# Patient Record
Sex: MALE | Race: WHITE | Employment: FULL TIME | URBAN - METROPOLITAN AREA
[De-identification: names, ages, dates, MRNs, and addresses within clinical notes are randomized per-mention and may not be internally consistent; named-entity substitution may affect disease eponyms.]

---

## 2023-10-30 ENCOUNTER — HOSPITAL ENCOUNTER (OUTPATIENT)
Dept: PREADMISSION TESTING | Age: 63
Discharge: HOME | End: 2023-11-03
Payer: COMMERCIAL

## 2023-10-30 VITALS
RESPIRATION RATE: 20 BRPM | WEIGHT: 218.4 LBS | BODY MASS INDEX: 35.1 KG/M2 | TEMPERATURE: 97.8 F | DIASTOLIC BLOOD PRESSURE: 67 MMHG | SYSTOLIC BLOOD PRESSURE: 123 MMHG | HEIGHT: 66 IN | HEART RATE: 65 BPM | OXYGEN SATURATION: 98 %

## 2023-10-30 LAB
ABO + RH BLD: NORMAL
ALBUMIN SERPL-MCNC: 4.6 G/DL (ref 3.5–4.6)
ALP SERPL-CCNC: 82 U/L (ref 35–104)
ALT SERPL-CCNC: 24 U/L (ref 0–41)
ANION GAP SERPL CALCULATED.3IONS-SCNC: 16 MEQ/L (ref 9–15)
APTT PPP: 33.1 SEC (ref 24.4–36.8)
AST SERPL-CCNC: 19 U/L (ref 0–40)
BASOPHILS # BLD: 0 K/UL (ref 0–0.2)
BASOPHILS NFR BLD: 0.3 %
BILIRUB SERPL-MCNC: 1 MG/DL (ref 0.2–0.7)
BILIRUB UR QL STRIP: NEGATIVE
BLD GP AB SCN SERPL QL: NORMAL
BUN SERPL-MCNC: 21 MG/DL (ref 8–23)
CALCIUM SERPL-MCNC: 10.1 MG/DL (ref 8.5–9.9)
CHLORIDE SERPL-SCNC: 102 MEQ/L (ref 95–107)
CLARITY UR: CLEAR
CO2 SERPL-SCNC: 22 MEQ/L (ref 20–31)
COLOR UR: YELLOW
CREAT SERPL-MCNC: 1.13 MG/DL (ref 0.7–1.2)
EOSINOPHIL # BLD: 0.1 K/UL (ref 0–0.7)
EOSINOPHIL NFR BLD: 1.5 %
ERYTHROCYTE [DISTWIDTH] IN BLOOD BY AUTOMATED COUNT: 12.4 % (ref 11.5–14.5)
GLOBULIN SER CALC-MCNC: 2.8 G/DL (ref 2.3–3.5)
GLUCOSE SERPL-MCNC: 102 MG/DL (ref 70–99)
GLUCOSE UR STRIP-MCNC: NEGATIVE MG/DL
HCT VFR BLD AUTO: 40.9 % (ref 42–52)
HGB BLD-MCNC: 13 G/DL (ref 14–18)
HGB UR QL STRIP: NEGATIVE
INR PPP: 1
KETONES UR STRIP-MCNC: NEGATIVE MG/DL
LEUKOCYTE ESTERASE UR QL STRIP: NEGATIVE
LYMPHOCYTES # BLD: 1.4 K/UL (ref 1–4.8)
LYMPHOCYTES NFR BLD: 23.3 %
MCH RBC QN AUTO: 26.9 PG (ref 27–31.3)
MCHC RBC AUTO-ENTMCNC: 31.8 % (ref 33–37)
MCV RBC AUTO: 84.7 FL (ref 79–92.2)
MONOCYTES # BLD: 0.6 K/UL (ref 0.2–0.8)
MONOCYTES NFR BLD: 10.8 %
NEUTROPHILS # BLD: 3.7 K/UL (ref 1.4–6.5)
NEUTS SEG NFR BLD: 63.1 %
NITRITE UR QL STRIP: NEGATIVE
PH UR STRIP: 6.5 [PH] (ref 5–9)
PLATELET # BLD AUTO: 242 K/UL (ref 130–400)
POTASSIUM SERPL-SCNC: 4.2 MEQ/L (ref 3.4–4.9)
PROT SERPL-MCNC: 7.4 G/DL (ref 6.3–8)
PROT UR STRIP-MCNC: NEGATIVE MG/DL
PROTHROMBIN TIME: 13.1 SEC (ref 12.3–14.9)
RBC # BLD AUTO: 4.83 M/UL (ref 4.7–6.1)
SODIUM SERPL-SCNC: 140 MEQ/L (ref 135–144)
SP GR UR STRIP: 1.01 (ref 1–1.03)
URINE REFLEX TO CULTURE: NORMAL
UROBILINOGEN UR STRIP-ACNC: 0.2 E.U./DL
WBC # BLD AUTO: 5.9 K/UL (ref 4.8–10.8)

## 2023-10-30 PROCEDURE — 87641 MR-STAPH DNA AMP PROBE: CPT

## 2023-10-30 PROCEDURE — 80053 COMPREHEN METABOLIC PANEL: CPT

## 2023-10-30 PROCEDURE — 85610 PROTHROMBIN TIME: CPT

## 2023-10-30 PROCEDURE — 85730 THROMBOPLASTIN TIME PARTIAL: CPT

## 2023-10-30 PROCEDURE — 86900 BLOOD TYPING SEROLOGIC ABO: CPT

## 2023-10-30 PROCEDURE — 86901 BLOOD TYPING SEROLOGIC RH(D): CPT

## 2023-10-30 PROCEDURE — 86850 RBC ANTIBODY SCREEN: CPT

## 2023-10-30 PROCEDURE — 81003 URINALYSIS AUTO W/O SCOPE: CPT

## 2023-10-30 PROCEDURE — 85025 COMPLETE CBC W/AUTO DIFF WBC: CPT

## 2023-10-30 PROCEDURE — 93005 ELECTROCARDIOGRAM TRACING: CPT | Performed by: ORTHOPAEDIC SURGERY

## 2023-10-30 RX ORDER — MELOXICAM 15 MG/1
15 TABLET ORAL DAILY
Qty: 30 TABLET | Refills: 11 | COMMUNITY
Start: 2023-07-18 | End: 2024-07-12

## 2023-10-30 RX ORDER — VALACYCLOVIR HYDROCHLORIDE 1 G/1
2000 TABLET, FILM COATED ORAL 2 TIMES DAILY
COMMUNITY
Start: 2023-07-18

## 2023-10-30 RX ORDER — ATORVASTATIN CALCIUM 20 MG/1
20 TABLET, FILM COATED ORAL DAILY
COMMUNITY
Start: 2023-07-18

## 2023-10-30 RX ORDER — TAMSULOSIN HYDROCHLORIDE 0.4 MG/1
0.4 CAPSULE ORAL NIGHTLY
COMMUNITY
Start: 2023-07-18

## 2023-10-30 NOTE — PROGRESS NOTES
PAT appt completed, pt given pre-op instructions (orange sheet) and Mary Anne-hex with instruction sheet. CBCWD, CMP, PT/INR, aPTT, T/S, UAR, MRSA swab & EKG done.  Electronically signed by Celestino You RN on 10/30/2023 at 11:39 AM

## 2023-10-31 LAB
EKG ATRIAL RATE: 64 BPM
EKG P AXIS: 36 DEGREES
EKG P-R INTERVAL: 172 MS
EKG Q-T INTERVAL: 396 MS
EKG QRS DURATION: 94 MS
EKG QTC CALCULATION (BAZETT): 408 MS
EKG R AXIS: -7 DEGREES
EKG T AXIS: 21 DEGREES
EKG VENTRICULAR RATE: 64 BPM
MRSA, DNA, NASAL: NEGATIVE
SPECIMEN DESCRIPTION: NORMAL

## 2023-11-05 ENCOUNTER — ANESTHESIA EVENT (OUTPATIENT)
Dept: OPERATING ROOM | Age: 63
End: 2023-11-05
Payer: COMMERCIAL

## 2023-11-06 ENCOUNTER — APPOINTMENT (OUTPATIENT)
Dept: GENERAL RADIOLOGY | Age: 63
End: 2023-11-06
Attending: ORTHOPAEDIC SURGERY
Payer: COMMERCIAL

## 2023-11-06 ENCOUNTER — HOSPITAL ENCOUNTER (OUTPATIENT)
Age: 63
Setting detail: OBSERVATION
Discharge: HOME HEALTH | End: 2023-11-07
Attending: ORTHOPAEDIC SURGERY | Admitting: ORTHOPAEDIC SURGERY
Payer: COMMERCIAL

## 2023-11-06 ENCOUNTER — ANESTHESIA (OUTPATIENT)
Dept: OPERATING ROOM | Age: 63
End: 2023-11-06
Payer: COMMERCIAL

## 2023-11-06 DIAGNOSIS — Z96.651 STATUS POST TOTAL KNEE REPLACEMENT, RIGHT: Primary | ICD-10-CM

## 2023-11-06 PROCEDURE — 73560 X-RAY EXAM OF KNEE 1 OR 2: CPT

## 2023-11-06 PROCEDURE — 2500000003 HC RX 250 WO HCPCS: Performed by: ANESTHESIOLOGY

## 2023-11-06 PROCEDURE — 3600000004 HC SURGERY LEVEL 4 BASE: Performed by: ORTHOPAEDIC SURGERY

## 2023-11-06 PROCEDURE — 2580000003 HC RX 258: Performed by: NURSE PRACTITIONER

## 2023-11-06 PROCEDURE — 6360000002 HC RX W HCPCS: Performed by: ORTHOPAEDIC SURGERY

## 2023-11-06 PROCEDURE — 2580000003 HC RX 258: Performed by: ANESTHESIOLOGY

## 2023-11-06 PROCEDURE — 6370000000 HC RX 637 (ALT 250 FOR IP): Performed by: NURSE PRACTITIONER

## 2023-11-06 PROCEDURE — 6360000002 HC RX W HCPCS: Performed by: ANESTHESIOLOGY

## 2023-11-06 PROCEDURE — 64447 NJX AA&/STRD FEMORAL NRV IMG: CPT | Performed by: ANESTHESIOLOGY

## 2023-11-06 PROCEDURE — G0378 HOSPITAL OBSERVATION PER HR: HCPCS

## 2023-11-06 PROCEDURE — 3600000014 HC SURGERY LEVEL 4 ADDTL 15MIN: Performed by: ORTHOPAEDIC SURGERY

## 2023-11-06 PROCEDURE — 2720000010 HC SURG SUPPLY STERILE: Performed by: ORTHOPAEDIC SURGERY

## 2023-11-06 PROCEDURE — A4216 STERILE WATER/SALINE, 10 ML: HCPCS | Performed by: ORTHOPAEDIC SURGERY

## 2023-11-06 PROCEDURE — 7100000000 HC PACU RECOVERY - FIRST 15 MIN: Performed by: ORTHOPAEDIC SURGERY

## 2023-11-06 PROCEDURE — 97166 OT EVAL MOD COMPLEX 45 MIN: CPT

## 2023-11-06 PROCEDURE — C1776 JOINT DEVICE (IMPLANTABLE): HCPCS | Performed by: ORTHOPAEDIC SURGERY

## 2023-11-06 PROCEDURE — 2580000003 HC RX 258: Performed by: ORTHOPAEDIC SURGERY

## 2023-11-06 PROCEDURE — 27447 TOTAL KNEE ARTHROPLASTY: CPT | Performed by: PHYSICIAN ASSISTANT

## 2023-11-06 PROCEDURE — 3700000000 HC ANESTHESIA ATTENDED CARE: Performed by: ORTHOPAEDIC SURGERY

## 2023-11-06 PROCEDURE — 2709999900 HC NON-CHARGEABLE SUPPLY: Performed by: ORTHOPAEDIC SURGERY

## 2023-11-06 PROCEDURE — 94150 VITAL CAPACITY TEST: CPT

## 2023-11-06 PROCEDURE — 6360000002 HC RX W HCPCS: Performed by: NURSE PRACTITIONER

## 2023-11-06 PROCEDURE — 7100000001 HC PACU RECOVERY - ADDTL 15 MIN: Performed by: ORTHOPAEDIC SURGERY

## 2023-11-06 PROCEDURE — 6370000000 HC RX 637 (ALT 250 FOR IP): Performed by: REGISTERED NURSE

## 2023-11-06 PROCEDURE — 27447 TOTAL KNEE ARTHROPLASTY: CPT | Performed by: ORTHOPAEDIC SURGERY

## 2023-11-06 PROCEDURE — 97162 PT EVAL MOD COMPLEX 30 MIN: CPT

## 2023-11-06 PROCEDURE — 3700000001 HC ADD 15 MINUTES (ANESTHESIA): Performed by: ORTHOPAEDIC SURGERY

## 2023-11-06 DEVICE — IMPLANTABLE DEVICE: Type: IMPLANTABLE DEVICE | Site: KNEE | Status: FUNCTIONAL

## 2023-11-06 DEVICE — KIT TKR TRABECULAR MTL FEM TIB PAT AND VIT E SURF PERSONA: Type: IMPLANTABLE DEVICE | Site: KNEE | Status: FUNCTIONAL

## 2023-11-06 DEVICE — PSN MC VE ASF R 14MM 6-7/EF: Type: IMPLANTABLE DEVICE | Site: KNEE | Status: FUNCTIONAL

## 2023-11-06 DEVICE — IMPLANTABLE DEVICE
Type: IMPLANTABLE DEVICE | Site: KNEE | Status: FUNCTIONAL
Brand: NEXGEN® TRABECULAR METAL™

## 2023-11-06 DEVICE — BASEPLATE TIB KEELED 0 DEG F RT KNEE SPIKE OSSEOTI PERSONA: Type: IMPLANTABLE DEVICE | Site: KNEE | Status: FUNCTIONAL

## 2023-11-06 RX ORDER — ACETAMINOPHEN 325 MG/1
650 TABLET ORAL EVERY 6 HOURS
Status: DISCONTINUED | OUTPATIENT
Start: 2023-11-06 | End: 2023-11-07 | Stop reason: HOSPADM

## 2023-11-06 RX ORDER — TAMSULOSIN HYDROCHLORIDE 0.4 MG/1
0.4 CAPSULE ORAL NIGHTLY
Status: DISCONTINUED | OUTPATIENT
Start: 2023-11-07 | End: 2023-11-07 | Stop reason: HOSPADM

## 2023-11-06 RX ORDER — SENNA AND DOCUSATE SODIUM 50; 8.6 MG/1; MG/1
1 TABLET, FILM COATED ORAL 2 TIMES DAILY
Status: DISCONTINUED | OUTPATIENT
Start: 2023-11-06 | End: 2023-11-07 | Stop reason: HOSPADM

## 2023-11-06 RX ORDER — ATORVASTATIN CALCIUM 20 MG/1
20 TABLET, FILM COATED ORAL DAILY
Status: DISCONTINUED | OUTPATIENT
Start: 2023-11-06 | End: 2023-11-07 | Stop reason: HOSPADM

## 2023-11-06 RX ORDER — HYDROXYZINE HYDROCHLORIDE 10 MG/1
10 TABLET, FILM COATED ORAL EVERY 8 HOURS PRN
Status: DISCONTINUED | OUTPATIENT
Start: 2023-11-06 | End: 2023-11-07 | Stop reason: HOSPADM

## 2023-11-06 RX ORDER — TRANEXAMIC ACID 650 MG/1
1950 TABLET ORAL
Status: DISCONTINUED | OUTPATIENT
Start: 2023-11-06 | End: 2023-11-06 | Stop reason: HOSPADM

## 2023-11-06 RX ORDER — ROPIVACAINE HYDROCHLORIDE 5 MG/ML
INJECTION, SOLUTION EPIDURAL; INFILTRATION; PERINEURAL
Status: COMPLETED | OUTPATIENT
Start: 2023-11-06 | End: 2023-11-06

## 2023-11-06 RX ORDER — SODIUM CHLORIDE 0.9 % (FLUSH) 0.9 %
5-40 SYRINGE (ML) INJECTION EVERY 12 HOURS SCHEDULED
Status: DISCONTINUED | OUTPATIENT
Start: 2023-11-06 | End: 2023-11-07 | Stop reason: HOSPADM

## 2023-11-06 RX ORDER — SODIUM CHLORIDE, SODIUM LACTATE, POTASSIUM CHLORIDE, CALCIUM CHLORIDE 600; 310; 30; 20 MG/100ML; MG/100ML; MG/100ML; MG/100ML
INJECTION, SOLUTION INTRAVENOUS CONTINUOUS
Status: DISCONTINUED | OUTPATIENT
Start: 2023-11-06 | End: 2023-11-06 | Stop reason: HOSPADM

## 2023-11-06 RX ORDER — OXYCODONE HCL 10 MG/1
10 TABLET, FILM COATED, EXTENDED RELEASE ORAL ONCE
Status: COMPLETED | OUTPATIENT
Start: 2023-11-06 | End: 2023-11-06

## 2023-11-06 RX ORDER — MAGNESIUM HYDROXIDE 1200 MG/15ML
LIQUID ORAL CONTINUOUS PRN
Status: DISCONTINUED | OUTPATIENT
Start: 2023-11-06 | End: 2023-11-06 | Stop reason: HOSPADM

## 2023-11-06 RX ORDER — MORPHINE SULFATE 2 MG/ML
2 INJECTION, SOLUTION INTRAMUSCULAR; INTRAVENOUS
Status: DISCONTINUED | OUTPATIENT
Start: 2023-11-06 | End: 2023-11-07 | Stop reason: HOSPADM

## 2023-11-06 RX ORDER — CELECOXIB 200 MG/1
200 CAPSULE ORAL ONCE
Status: COMPLETED | OUTPATIENT
Start: 2023-11-06 | End: 2023-11-06

## 2023-11-06 RX ORDER — SODIUM CHLORIDE 0.9 % (FLUSH) 0.9 %
5-40 SYRINGE (ML) INJECTION PRN
Status: DISCONTINUED | OUTPATIENT
Start: 2023-11-06 | End: 2023-11-07 | Stop reason: HOSPADM

## 2023-11-06 RX ORDER — TRANEXAMIC ACID 650 MG/1
1950 TABLET ORAL EVERY 8 HOURS
Status: COMPLETED | OUTPATIENT
Start: 2023-11-06 | End: 2023-11-06

## 2023-11-06 RX ORDER — ONDANSETRON 2 MG/ML
4 INJECTION INTRAMUSCULAR; INTRAVENOUS EVERY 6 HOURS PRN
Status: DISCONTINUED | OUTPATIENT
Start: 2023-11-06 | End: 2023-11-07 | Stop reason: HOSPADM

## 2023-11-06 RX ORDER — TAMSULOSIN HYDROCHLORIDE 0.4 MG/1
0.4 CAPSULE ORAL NIGHTLY
Status: DISCONTINUED | OUTPATIENT
Start: 2023-11-07 | End: 2023-11-06

## 2023-11-06 RX ORDER — KETOROLAC TROMETHAMINE 15 MG/ML
7.5 INJECTION, SOLUTION INTRAMUSCULAR; INTRAVENOUS EVERY 6 HOURS
Status: COMPLETED | OUTPATIENT
Start: 2023-11-06 | End: 2023-11-07

## 2023-11-06 RX ORDER — SODIUM CHLORIDE 9 MG/ML
INJECTION, SOLUTION INTRAVENOUS PRN
Status: DISCONTINUED | OUTPATIENT
Start: 2023-11-06 | End: 2023-11-07 | Stop reason: HOSPADM

## 2023-11-06 RX ORDER — TRANEXAMIC ACID 650 MG/1
1950 TABLET ORAL ONCE
Status: COMPLETED | OUTPATIENT
Start: 2023-11-07 | End: 2023-11-07

## 2023-11-06 RX ORDER — PROPOFOL 10 MG/ML
INJECTION, EMULSION INTRAVENOUS CONTINUOUS PRN
Status: DISCONTINUED | OUTPATIENT
Start: 2023-11-06 | End: 2023-11-06 | Stop reason: SDUPTHER

## 2023-11-06 RX ORDER — OXYCODONE HYDROCHLORIDE 5 MG/1
2.5 TABLET ORAL EVERY 4 HOURS PRN
Status: DISCONTINUED | OUTPATIENT
Start: 2023-11-06 | End: 2023-11-07 | Stop reason: HOSPADM

## 2023-11-06 RX ORDER — MIDAZOLAM HYDROCHLORIDE 1 MG/ML
INJECTION INTRAMUSCULAR; INTRAVENOUS PRN
Status: DISCONTINUED | OUTPATIENT
Start: 2023-11-06 | End: 2023-11-06 | Stop reason: SDUPTHER

## 2023-11-06 RX ORDER — OXYCODONE HYDROCHLORIDE 5 MG/1
5 TABLET ORAL EVERY 4 HOURS PRN
Status: DISCONTINUED | OUTPATIENT
Start: 2023-11-06 | End: 2023-11-07 | Stop reason: HOSPADM

## 2023-11-06 RX ORDER — CYCLOBENZAPRINE HCL 10 MG
10 TABLET ORAL EVERY 12 HOURS PRN
Status: DISCONTINUED | OUTPATIENT
Start: 2023-11-06 | End: 2023-11-07 | Stop reason: HOSPADM

## 2023-11-06 RX ORDER — LISINOPRIL AND HYDROCHLOROTHIAZIDE 20; 12.5 MG/1; MG/1
1 TABLET ORAL DAILY
Status: DISCONTINUED | OUTPATIENT
Start: 2023-11-07 | End: 2023-11-07 | Stop reason: HOSPADM

## 2023-11-06 RX ORDER — ACETAMINOPHEN 500 MG
1000 TABLET ORAL ONCE
Status: COMPLETED | OUTPATIENT
Start: 2023-11-06 | End: 2023-11-06

## 2023-11-06 RX ORDER — EPHEDRINE SULFATE 50 MG/ML
INJECTION, SOLUTION INTRAVENOUS PRN
Status: DISCONTINUED | OUTPATIENT
Start: 2023-11-06 | End: 2023-11-06 | Stop reason: SDUPTHER

## 2023-11-06 RX ORDER — BUPIVACAINE HYDROCHLORIDE 5 MG/ML
INJECTION, SOLUTION EPIDURAL; INTRACAUDAL
Status: COMPLETED | OUTPATIENT
Start: 2023-11-06 | End: 2023-11-06

## 2023-11-06 RX ORDER — ASPIRIN 81 MG/1
81 TABLET ORAL 2 TIMES DAILY
Status: DISCONTINUED | OUTPATIENT
Start: 2023-11-06 | End: 2023-11-07 | Stop reason: HOSPADM

## 2023-11-06 RX ORDER — ONDANSETRON 4 MG/1
4 TABLET, ORALLY DISINTEGRATING ORAL EVERY 8 HOURS PRN
Status: DISCONTINUED | OUTPATIENT
Start: 2023-11-06 | End: 2023-11-07 | Stop reason: HOSPADM

## 2023-11-06 RX ORDER — MAGNESIUM HYDROXIDE/ALUMINUM HYDROXICE/SIMETHICONE 120; 1200; 1200 MG/30ML; MG/30ML; MG/30ML
15 SUSPENSION ORAL EVERY 6 HOURS PRN
Status: DISCONTINUED | OUTPATIENT
Start: 2023-11-06 | End: 2023-11-07 | Stop reason: HOSPADM

## 2023-11-06 RX ORDER — SODIUM CHLORIDE 9 MG/ML
INJECTION, SOLUTION INTRAVENOUS CONTINUOUS
Status: DISCONTINUED | OUTPATIENT
Start: 2023-11-06 | End: 2023-11-07 | Stop reason: HOSPADM

## 2023-11-06 RX ADMIN — ACETAMINOPHEN 650 MG: 325 TABLET ORAL at 18:25

## 2023-11-06 RX ADMIN — CEFAZOLIN 2000 MG: 2 INJECTION, POWDER, FOR SOLUTION INTRAMUSCULAR; INTRAVENOUS at 16:13

## 2023-11-06 RX ADMIN — KETOROLAC TROMETHAMINE 7.5 MG: 15 INJECTION, SOLUTION INTRAMUSCULAR; INTRAVENOUS at 22:04

## 2023-11-06 RX ADMIN — MIDAZOLAM HYDROCHLORIDE 2 MG: 1 INJECTION, SOLUTION INTRAMUSCULAR; INTRAVENOUS at 07:25

## 2023-11-06 RX ADMIN — ACETAMINOPHEN 650 MG: 325 TABLET ORAL at 11:43

## 2023-11-06 RX ADMIN — SODIUM CHLORIDE: 9 INJECTION, SOLUTION INTRAVENOUS at 11:46

## 2023-11-06 RX ADMIN — SENNOSIDES AND DOCUSATE SODIUM 1 TABLET: 8.6; 5 TABLET ORAL at 20:52

## 2023-11-06 RX ADMIN — Medication 0.1 MG: at 08:51

## 2023-11-06 RX ADMIN — OXYCODONE HYDROCHLORIDE 10 MG: 10 TABLET, FILM COATED, EXTENDED RELEASE ORAL at 06:19

## 2023-11-06 RX ADMIN — ASPIRIN 81 MG: 81 TABLET, COATED ORAL at 20:52

## 2023-11-06 RX ADMIN — OXYCODONE 5 MG: 5 TABLET ORAL at 18:26

## 2023-11-06 RX ADMIN — ACETAMINOPHEN 1000 MG: 500 TABLET ORAL at 06:20

## 2023-11-06 RX ADMIN — KETOROLAC TROMETHAMINE 7.5 MG: 15 INJECTION, SOLUTION INTRAMUSCULAR; INTRAVENOUS at 16:04

## 2023-11-06 RX ADMIN — OXYCODONE 5 MG: 5 TABLET ORAL at 14:19

## 2023-11-06 RX ADMIN — EPHEDRINE SULFATE 10 MG: 50 INJECTION, SOLUTION INTRAVENOUS at 08:47

## 2023-11-06 RX ADMIN — EPHEDRINE SULFATE 10 MG: 50 INJECTION, SOLUTION INTRAVENOUS at 09:11

## 2023-11-06 RX ADMIN — ROPIVACAINE HYDROCHLORIDE 25 ML: 5 INJECTION, SOLUTION EPIDURAL; INFILTRATION; PERINEURAL at 07:33

## 2023-11-06 RX ADMIN — SENNOSIDES AND DOCUSATE SODIUM 1 TABLET: 8.6; 5 TABLET ORAL at 11:44

## 2023-11-06 RX ADMIN — TRANEXAMIC ACID 1950 MG: 650 TABLET, FILM COATED ORAL at 14:19

## 2023-11-06 RX ADMIN — Medication 0.1 MG: at 08:44

## 2023-11-06 RX ADMIN — CELECOXIB 200 MG: 200 CAPSULE ORAL at 06:20

## 2023-11-06 RX ADMIN — ONDANSETRON 4 MG: 2 INJECTION INTRAMUSCULAR; INTRAVENOUS at 16:02

## 2023-11-06 RX ADMIN — Medication 0.1 MG: at 08:37

## 2023-11-06 RX ADMIN — CYCLOBENZAPRINE 10 MG: 10 TABLET, FILM COATED ORAL at 20:52

## 2023-11-06 RX ADMIN — CEFAZOLIN 2000 MG: 2 INJECTION, POWDER, FOR SOLUTION INTRAMUSCULAR; INTRAVENOUS at 07:57

## 2023-11-06 RX ADMIN — Medication 10 ML: at 20:56

## 2023-11-06 RX ADMIN — Medication 0.1 MG: at 08:59

## 2023-11-06 RX ADMIN — SODIUM CHLORIDE, POTASSIUM CHLORIDE, SODIUM LACTATE AND CALCIUM CHLORIDE: 600; 310; 30; 20 INJECTION, SOLUTION INTRAVENOUS at 06:33

## 2023-11-06 RX ADMIN — ATORVASTATIN CALCIUM 20 MG: 20 TABLET, FILM COATED ORAL at 20:55

## 2023-11-06 RX ADMIN — EPHEDRINE SULFATE 20 MG: 50 INJECTION, SOLUTION INTRAVENOUS at 07:45

## 2023-11-06 RX ADMIN — BUPIVACAINE HYDROCHLORIDE 10 MG: 5 INJECTION, SOLUTION EPIDURAL; INTRACAUDAL; PERINEURAL at 07:43

## 2023-11-06 RX ADMIN — PROPOFOL 100 MCG/KG/MIN: 10 INJECTION, EMULSION INTRAVENOUS at 07:56

## 2023-11-06 RX ADMIN — OXYCODONE 5 MG: 5 TABLET ORAL at 22:42

## 2023-11-06 RX ADMIN — Medication 10 ML: at 12:05

## 2023-11-06 RX ADMIN — TRANEXAMIC ACID 1950 MG: 650 TABLET, FILM COATED ORAL at 06:20

## 2023-11-06 RX ADMIN — EPHEDRINE SULFATE 10 MG: 50 INJECTION, SOLUTION INTRAVENOUS at 08:09

## 2023-11-06 ASSESSMENT — PAIN DESCRIPTION - ORIENTATION
ORIENTATION: RIGHT

## 2023-11-06 ASSESSMENT — PAIN SCALES - GENERAL
PAINLEVEL_OUTOF10: 0
PAINLEVEL_OUTOF10: 0
PAINLEVEL_OUTOF10: 3
PAINLEVEL_OUTOF10: 4
PAINLEVEL_OUTOF10: 0
PAINLEVEL_OUTOF10: 5
PAINLEVEL_OUTOF10: 0

## 2023-11-06 ASSESSMENT — PAIN DESCRIPTION - DESCRIPTORS
DESCRIPTORS: ACHING;SPASM
DESCRIPTORS: BURNING

## 2023-11-06 ASSESSMENT — PAIN DESCRIPTION - PAIN TYPE: TYPE: SURGICAL PAIN

## 2023-11-06 ASSESSMENT — PAIN DESCRIPTION - LOCATION
LOCATION: KNEE;LEG
LOCATION: KNEE

## 2023-11-06 NOTE — DISCHARGE INSTRUCTIONS
them (on during the day, off at night)   Discharging RN who has gone over instructions and acknowledges luis hose have been received         *OXYCODONE next dose @ 3:45pm  *Flexeril next dose @ 6pm  *Tylenol next dose @ 6pm

## 2023-11-06 NOTE — OP NOTE
Operative Note      Patient: Kasandra Malave  YOB: 1960  MRN: [id number]    Date of Procedure: 11/6/2023    Pre-Op Diagnosis Codes:     * Osteoarthritis of right knee, unspecified osteoarthritis type [M17.11]    Post-Op Diagnosis: Same       Procedure(s):  RIGHT KNEE RIGHT TOTAL KNEE ARTHROPLASTY    Surgeon(s):  Marjorie Echavarria MD    Assistant:   Physician Assistant: Dillan Taylor PA-C    Anesthesia: Spinal    Estimated Blood Loss (mL): less than 50     Complications: None    Specimens:   * No specimens in log *    Implants:  Implant Name Type Inv. Item Serial No.  Lot No. LRB No. Used Action   PSN MC VE ASF R 14MM 6-7/EF - [id number]  PSN MC VE ASF R 14MM 6-7/EF  [employer]- [id number] Right 1 Implanted   BASEPLATE TIB KEELED 0 DEG F RT KNEE [employer] - [id number]  BASEPLATE TIB KEELED 0 DEG F RT KNEE [employer]- [id number] Right 1 Implanted   COMPONENT PATELLAR 38X10 MM PRIMARY TRABECULAR MTL POROUS - [id number]  COMPONENT PATELLAR 38X10 MM PRIMARY TRABECULAR MTL POROUS  [employer]- [id number] Right 1 Implanted   IMPL KNEE PSN FEM CR POR CCR STD SZ7 R - [id number] Knee IMPL KNEE PSN FEM CR POR CCR STD SZ7 R  [employer]-PMM [id number] Right 1 Implanted         Drains: * No LDAs found *    Findings: Osteoarthrosis knee        Detailed Description of Procedure:     Components: Jim Persona    See above implant record      Indications: This is a 71-year-old male with degenerative arthrosis of the right knee. They have failed conservative treatment. Total knee arthroplasty was offered. The procedure was explained along with the risks, benefits and alternatives being reviewed.   Potential risks including but not limited to infection, neurovascular complication, loosening, wear, arthrofibrosis, fracture, pain as well as the potential need for reoperation and/or revision surgery were all discussed with the patient

## 2023-11-06 NOTE — FLOWSHEET NOTE
Pt admitted to floor from PACU. Assessment completed. VSS. Alert et oriented x4. Dressing dry et intact to right leg. Denies pain at this time. Call light within reach. Oriented to room et call light. Electronically signed by Jose Whitehead RN on 11/6/2023 at 2:02 PM

## 2023-11-06 NOTE — ANESTHESIA POSTPROCEDURE EVALUATION
Department of Anesthesiology  Postprocedure Note    Patient: Kenna Barrow  MRN: [id number]  [facility] 1960  Date of evaluation: 11/6/2023      Procedure Summary     Date: 11/06/23 Room / Location: [address]    Anesthesia Start: 7043 Anesthesia Stop: 4988    Procedure: RIGHT KNEE RIGHT TOTAL KNEE ARTHROPLASTY (Right: Knee) Diagnosis:       Osteoarthritis of right knee, unspecified osteoarthritis type      (Osteoarthritis of right knee, unspecified osteoarthritis type [M17.11])    Surgeons: Coleman Lockhart MD Responsible Provider: Bronson Rojo MD    Anesthesia Type: Regional, Spinal ASA Status: 3          Anesthesia Type: Regional, Spinal    Arline Phase I: Arline Score: 10    Arline Phase II:        Anesthesia Post Evaluation    Patient location during evaluation: bedside  Patient participation: complete - patient participated  Level of consciousness: awake and awake and alert  Airway patency: patent  Nausea & Vomiting: no nausea and no vomiting  Complications: no  Cardiovascular status: blood pressure returned to baseline and hemodynamically stable  Respiratory status: acceptable  Hydration status: euvolemic  Pain management: adequate

## 2023-11-06 NOTE — ANESTHESIA PROCEDURE NOTES
Spinal Block    Patient location during procedure: pre-op  End time: 11/6/2023 7:43 AM  Reason for block: primary anesthetic  Staffing  Performed: anesthesiologist   Anesthesiologist: Rock Cole MD  Performed by: Rock Cole MD  Authorized by: Rock Cole MD    Spinal Block  Patient position: sitting  Prep: ChloraPrep and site prepped and draped  Patient monitoring: continuous pulse ox, frequent blood pressure checks and oxygen  Approach: midline  Location: L3/L4  Provider prep: mask and sterile gloves  Local infiltration: lidocaine  Needle  Needle type: Pencan   Needle gauge: 25 G  Needle length: 3.5 in  Assessment  Swirl obtained: Yes  CSF: clear  Attempts: 1  Hemodynamics: stable  Preanesthetic Checklist  Completed: patient identified, IV checked, site marked, risks and benefits discussed, surgical/procedural consents, equipment checked, pre-op evaluation, timeout performed, anesthesia consent given, oxygen available, monitors applied/VS acknowledged, fire risk safety assessment completed and verbalized and blood product R/B/A discussed and consented

## 2023-11-06 NOTE — PLAN OF CARE
See OT evaluation for all goals and OT POC.  Electronically signed by PRATIK Collier on 11/6/2023 at 3:31 PM

## 2023-11-06 NOTE — CONSULTS
Consult Note            Date:11/6/2023        Patient Valeriy Samayoa     YOB: 1960     Age:62 y.o. Reason for Consult: Hypertension and hyperlipidemia postoperatively    Chief Complaint   Osteoarthritis of right knee, right knee replacement    History Obtained From   patient, electronic medical record    History of Present Illness   Patient is a 27-year-old male with past medical history of arthritis, cluster headaches, hypertension, and hyperlipidemia status post right knee replacement postop day 0. Patient reports a history of osteoarthritis of the right knee unrelieved by conservative management. Patient denies chest pain, palpitations, headache, dizziness, shortness of breath, cough, fever, chills, N/V/D, and changes in appetite. Hospitalist consulted for treatment of hypertension and hyperlipidemia postoperatively. Past Medical History     Past Medical History:   Diagnosis Date    Arthritis     Headaches, cluster     HTN (hypertension)     Hyperlipidemia     Muscle spasm     of the uppser back    Obesity     PONV (postoperative nausea and vomiting)     Rectal bleeding     secondary to fissure    Reflux esophagitis     Ventral hernia         Past Surgical History     Past Surgical History:   Procedure Laterality Date    COLONOSCOPY      INGUINAL HERNIA REPAIR Left 2023    JOINT REPLACEMENT      TONSILLECTOMY      TOTAL KNEE ARTHROPLASTY Right 8282    UMBILICAL HERNIA REPAIR  2023    VASECTOMY          Medications     Prior to Admission medications    Medication Sig Start Date End Date Taking?  Authorizing Provider   ZINC SULFATE PO Take by mouth daily    Chris Cruz MD   atorvastatin (LIPITOR) 20 MG tablet Take 1 tablet by mouth daily 7/18/23   Chris Cruz MD   vitamin D3 (CHOLECALCIFEROL) 125 MCG (5000 UT) TABS tablet Take 1 tablet by mouth daily 10/14/15   Chris Cruz MD   MAGNESIUM PO Take by mouth    Chris Cruz MD   tamsulosin [facility]) 154

## 2023-11-06 NOTE — ANESTHESIA PROCEDURE NOTES
Peripheral Block    Patient location during procedure: pre-op  Reason for block: post-op pain management and at surgeon's request  Start time: 11/6/2023 7:33 AM  End time: 11/6/2023 7:34 AM  Staffing  Performed: anesthesiologist   Anesthesiologist: Mary Chew MD  Performed by: Mary Chew MD  Authorized by: Mary Chew MD    Preanesthetic Checklist  Completed: patient identified, IV checked, site marked, risks and benefits discussed, surgical/procedural consents, equipment checked, pre-op evaluation, timeout performed, anesthesia consent given, oxygen available and monitors applied/VS acknowledged  Peripheral Block   Patient position: supine  Prep: ChloraPrep  Provider prep: mask and sterile gloves (Sterile probe cover)  Patient monitoring: cardiac monitor, continuous pulse ox, frequent blood pressure checks and IV access  Block type: Femoral  Adductor canal  Laterality: right  Injection technique: single-shot  Guidance: nerve stimulator and ultrasound guided  Local infiltration: ropivacaine  Infiltration strength: 0.5 %  Local infiltration: ropivacaine  Dose: 25 mL    Needle   Needle type: combined needle/nerve stimulator   Needle gauge: 22 G  Needle localization: anatomical landmarks and ultrasound guidance  Needle length: 5 cm  Assessment   Injection assessment: negative aspiration for heme, no paresthesia on injection and local visualized surrounding nerve on ultrasound  Paresthesia pain: immediately resolved  Slow fractionated injection: yes  Hemodynamics: stable  Real-time US image taken/store: yes    Additional Notes  Ultrasound image printed and saved in patient chart.     Sterile probe cover used    Medications Administered  ropivacaine (NAROPIN) injection 0.5% - Perineural   25 mL - 11/6/2023 7:33:00 AM

## 2023-11-06 NOTE — INTERVAL H&P NOTE
Update History & Physical    The patient's History and Physical of October 25, 2023 was reviewed with the patient and I examined the patient. There was no change. The surgical site was confirmed by the patient and me. Plan: The risks, benefits, expected outcome, and alternative to the recommended procedure have been discussed with the patient. Patient understands and wants to proceed with the procedure.      Electronically signed by Pama Prader, MD on 11/6/2023 at 6:19 AM

## 2023-11-07 VITALS
TEMPERATURE: 97.9 F | WEIGHT: 220 LBS | DIASTOLIC BLOOD PRESSURE: 62 MMHG | HEIGHT: 65 IN | BODY MASS INDEX: 36.65 KG/M2 | HEART RATE: 70 BPM | OXYGEN SATURATION: 99 % | SYSTOLIC BLOOD PRESSURE: 123 MMHG | RESPIRATION RATE: 18 BRPM

## 2023-11-07 LAB
ANION GAP SERPL CALCULATED.3IONS-SCNC: 9 MEQ/L (ref 9–15)
BUN SERPL-MCNC: 22 MG/DL (ref 8–23)
CALCIUM SERPL-MCNC: 8.3 MG/DL (ref 8.5–9.9)
CHLORIDE SERPL-SCNC: 104 MEQ/L (ref 95–107)
CO2 SERPL-SCNC: 25 MEQ/L (ref 20–31)
CREAT SERPL-MCNC: 1.39 MG/DL (ref 0.7–1.2)
ERYTHROCYTE [DISTWIDTH] IN BLOOD BY AUTOMATED COUNT: 12.7 % (ref 11.5–14.5)
GLUCOSE SERPL-MCNC: 123 MG/DL (ref 70–99)
HCT VFR BLD AUTO: 31.7 % (ref 42–52)
HGB BLD-MCNC: 10 G/DL (ref 14–18)
MCH RBC QN AUTO: 27.2 PG (ref 27–31.3)
MCHC RBC AUTO-ENTMCNC: 31.5 % (ref 33–37)
MCV RBC AUTO: 86.4 FL (ref 79–92.2)
PLATELET # BLD AUTO: 180 K/UL (ref 130–400)
POTASSIUM SERPL-SCNC: 4.3 MEQ/L (ref 3.4–4.9)
RBC # BLD AUTO: 3.67 M/UL (ref 4.7–6.1)
SODIUM SERPL-SCNC: 138 MEQ/L (ref 135–144)
WBC # BLD AUTO: 5.9 K/UL (ref 4.8–10.8)

## 2023-11-07 PROCEDURE — 96375 TX/PRO/DX INJ NEW DRUG ADDON: CPT

## 2023-11-07 PROCEDURE — 96361 HYDRATE IV INFUSION ADD-ON: CPT

## 2023-11-07 PROCEDURE — G0378 HOSPITAL OBSERVATION PER HR: HCPCS

## 2023-11-07 PROCEDURE — 6360000002 HC RX W HCPCS: Performed by: NURSE PRACTITIONER

## 2023-11-07 PROCEDURE — 96376 TX/PRO/DX INJ SAME DRUG ADON: CPT

## 2023-11-07 PROCEDURE — 80048 BASIC METABOLIC PNL TOTAL CA: CPT

## 2023-11-07 PROCEDURE — 97535 SELF CARE MNGMENT TRAINING: CPT

## 2023-11-07 PROCEDURE — 97110 THERAPEUTIC EXERCISES: CPT

## 2023-11-07 PROCEDURE — 85027 COMPLETE CBC AUTOMATED: CPT

## 2023-11-07 PROCEDURE — 96374 THER/PROPH/DIAG INJ IV PUSH: CPT

## 2023-11-07 PROCEDURE — 6370000000 HC RX 637 (ALT 250 FOR IP): Performed by: NURSE PRACTITIONER

## 2023-11-07 PROCEDURE — 6370000000 HC RX 637 (ALT 250 FOR IP): Performed by: REGISTERED NURSE

## 2023-11-07 PROCEDURE — 97116 GAIT TRAINING THERAPY: CPT

## 2023-11-07 PROCEDURE — 36415 COLL VENOUS BLD VENIPUNCTURE: CPT

## 2023-11-07 PROCEDURE — 2580000003 HC RX 258: Performed by: NURSE PRACTITIONER

## 2023-11-07 RX ORDER — ASPIRIN 81 MG/1
81 TABLET ORAL 2 TIMES DAILY
Qty: 60 TABLET | Refills: 0 | Status: SHIPPED | OUTPATIENT
Start: 2023-11-07 | End: 2023-12-07

## 2023-11-07 RX ORDER — HYDROXYZINE HYDROCHLORIDE 10 MG/1
10 TABLET, FILM COATED ORAL EVERY 8 HOURS PRN
Qty: 30 TABLET | Refills: 0 | Status: SHIPPED | OUTPATIENT
Start: 2023-11-07 | End: 2023-11-17

## 2023-11-07 RX ORDER — CYCLOBENZAPRINE HCL 10 MG
10 TABLET ORAL 3 TIMES DAILY PRN
Qty: 30 TABLET | Refills: 0 | Status: SHIPPED | OUTPATIENT
Start: 2023-11-07 | End: 2023-11-17

## 2023-11-07 RX ORDER — OXYCODONE HYDROCHLORIDE 5 MG/1
5 TABLET ORAL EVERY 4 HOURS PRN
Qty: 40 TABLET | Refills: 0 | Status: SHIPPED | OUTPATIENT
Start: 2023-11-07 | End: 2023-11-14

## 2023-11-07 RX ORDER — SENNA AND DOCUSATE SODIUM 50; 8.6 MG/1; MG/1
1 TABLET, FILM COATED ORAL 2 TIMES DAILY
Qty: 60 TABLET | Refills: 0 | Status: SHIPPED | OUTPATIENT
Start: 2023-11-07 | End: 2023-12-07

## 2023-11-07 RX ORDER — SODIUM CHLORIDE, SODIUM LACTATE, POTASSIUM CHLORIDE, AND CALCIUM CHLORIDE .6; .31; .03; .02 G/100ML; G/100ML; G/100ML; G/100ML
500 INJECTION, SOLUTION INTRAVENOUS ONCE
Status: DISCONTINUED | OUTPATIENT
Start: 2023-11-07 | End: 2023-11-07 | Stop reason: HOSPADM

## 2023-11-07 RX ORDER — SODIUM CHLORIDE 9 MG/ML
INJECTION, SOLUTION INTRAVENOUS CONTINUOUS
Status: ACTIVE | OUTPATIENT
Start: 2023-11-07 | End: 2023-11-07

## 2023-11-07 RX ADMIN — OXYCODONE 5 MG: 5 TABLET ORAL at 11:44

## 2023-11-07 RX ADMIN — TRANEXAMIC ACID 1950 MG: 650 TABLET, FILM COATED ORAL at 06:03

## 2023-11-07 RX ADMIN — KETOROLAC TROMETHAMINE 7.5 MG: 15 INJECTION, SOLUTION INTRAMUSCULAR; INTRAVENOUS at 03:43

## 2023-11-07 RX ADMIN — CEFAZOLIN 2000 MG: 2 INJECTION, POWDER, FOR SOLUTION INTRAMUSCULAR; INTRAVENOUS at 00:21

## 2023-11-07 RX ADMIN — SENNOSIDES AND DOCUSATE SODIUM 1 TABLET: 8.6; 5 TABLET ORAL at 07:45

## 2023-11-07 RX ADMIN — SODIUM CHLORIDE: 9 INJECTION, SOLUTION INTRAVENOUS at 07:53

## 2023-11-07 RX ADMIN — ACETAMINOPHEN 650 MG: 325 TABLET ORAL at 06:03

## 2023-11-07 RX ADMIN — KETOROLAC TROMETHAMINE 7.5 MG: 15 INJECTION, SOLUTION INTRAMUSCULAR; INTRAVENOUS at 09:46

## 2023-11-07 RX ADMIN — ACETAMINOPHEN 650 MG: 325 TABLET ORAL at 00:17

## 2023-11-07 RX ADMIN — ACETAMINOPHEN 650 MG: 325 TABLET ORAL at 11:44

## 2023-11-07 RX ADMIN — TAMSULOSIN HYDROCHLORIDE 0.4 MG: 0.4 CAPSULE ORAL at 07:49

## 2023-11-07 RX ADMIN — ONDANSETRON 4 MG: 2 INJECTION INTRAMUSCULAR; INTRAVENOUS at 09:46

## 2023-11-07 RX ADMIN — OXYCODONE 5 MG: 5 TABLET ORAL at 03:42

## 2023-11-07 RX ADMIN — Medication 10 ML: at 07:46

## 2023-11-07 RX ADMIN — LISINOPRIL AND HYDROCHLOROTHIAZIDE 1 TABLET: 12.5; 2 TABLET ORAL at 07:45

## 2023-11-07 RX ADMIN — CYCLOBENZAPRINE 10 MG: 10 TABLET, FILM COATED ORAL at 09:54

## 2023-11-07 RX ADMIN — ASPIRIN 81 MG: 81 TABLET, COATED ORAL at 07:45

## 2023-11-07 RX ADMIN — OXYCODONE 5 MG: 5 TABLET ORAL at 07:45

## 2023-11-07 ASSESSMENT — PAIN DESCRIPTION - PAIN TYPE: TYPE: SURGICAL PAIN

## 2023-11-07 ASSESSMENT — PAIN SCALES - GENERAL
PAINLEVEL_OUTOF10: 0
PAINLEVEL_OUTOF10: 8
PAINLEVEL_OUTOF10: 7
PAINLEVEL_OUTOF10: 4
PAINLEVEL_OUTOF10: 8

## 2023-11-07 ASSESSMENT — PAIN DESCRIPTION - LOCATION
LOCATION: KNEE;LEG
LOCATION: KNEE

## 2023-11-07 ASSESSMENT — PAIN DESCRIPTION - ORIENTATION
ORIENTATION: RIGHT

## 2023-11-07 ASSESSMENT — PAIN DESCRIPTION - DESCRIPTORS
DESCRIPTORS: ACHING;SHOOTING
DESCRIPTORS: ACHING

## 2023-11-07 NOTE — CARE COORDINATION
[address] [facility].
Discharge Plan?       Leah De La Cruz, [address] [facility]  Case Management Department  Ph: [phone] Fax: [phone]

## 2023-11-07 NOTE — DISCHARGE INSTR - DIET

## 2023-11-07 NOTE — DISCHARGE SUMMARY
Discharge summary  This patient Berny Wynn was admitted to the hospital on 11/6/2023  after undergoing Procedure(s) (LRB):  RIGHT KNEE RIGHT TOTAL KNEE ARTHROPLASTY (Right) without complications that morning. During the postoperative period,while in hospital, patient was medically managed by the hospitalist. Please see medial notes and H&P for patients additional diagnoses. Ortho agrees with all medical diagnoses and treatments while patient in hospital.  No significant or unexpected findings or abnormal ortho imaging were noted during the hospital stay    Hospital course  Patient tolerated surgical procedure well and there was no complications. Patient progressed adequtly through their recovery during hospital stay including PT and rehabilitation. DVT prophylaxis was implemented POD#1  Patient was then D/C on  to Home  in stable condition. Patient was instructed on the use of pain medications, the signs and symptoms of infection, and was given our number to call should they have any questions or concerns following discharge.

## 2023-11-27 DIAGNOSIS — Z96.651 S/P TOTAL KNEE ARTHROPLASTY, RIGHT: Primary | ICD-10-CM

## 2023-11-28 ENCOUNTER — OFFICE VISIT (OUTPATIENT)
Dept: ORTHOPEDIC SURGERY | Facility: CLINIC | Age: 63
End: 2023-11-28
Payer: COMMERCIAL

## 2023-11-28 ENCOUNTER — CLINICAL SUPPORT (OUTPATIENT)
Dept: ORTHOPEDIC SURGERY | Facility: CLINIC | Age: 63
End: 2023-11-28
Payer: COMMERCIAL

## 2023-11-28 DIAGNOSIS — Z96.651 S/P TOTAL KNEE ARTHROPLASTY, RIGHT: Primary | ICD-10-CM

## 2023-11-28 DIAGNOSIS — Z96.651 S/P TOTAL KNEE ARTHROPLASTY, RIGHT: ICD-10-CM

## 2023-11-28 PROCEDURE — 99024 POSTOP FOLLOW-UP VISIT: CPT | Performed by: ORTHOPAEDIC SURGERY

## 2023-11-28 PROCEDURE — 73562 X-RAY EXAM OF KNEE 3: CPT | Mod: RIGHT SIDE | Performed by: ORTHOPAEDIC SURGERY

## 2023-11-28 ASSESSMENT — PAIN - FUNCTIONAL ASSESSMENT: PAIN_FUNCTIONAL_ASSESSMENT: NO/DENIES PAIN

## 2023-11-28 NOTE — PROGRESS NOTES
Chief Complaint   Patient presents with    Right Knee - Post-op     Right TKA on 11-06-23  Xrays today        The patient is here for follow-up of their side: right knee arthroplasty.  The patient has mild knee pain.  The patient has mild mechanical symptoms.  The patient has mild swelling.  The patient is approximately 3 week(s) postop    Physical examination:    Examination of the side: right knee  The incision is healing well  No erythema or warmth.  No instability varus or valgus stressing the knee at 0, 30 or 60 degrees.  No instability in the AP plane at 90 degrees.  Range of motion: 0 degrees extension, 95degrees flexion  There is mild tenderness  Calf is soft, Homans negative  The patient has intact ankle dorsiflexion and plantarflexion.    Radiographs:   XR knee right 3 views  Interpreted By:  Edin Amato,   STUDY:  XR KNEE RIGHT 3 VIEWS;  ;  11/28/2023 1:06 pm      INDICATION:  Signs/Symptoms:pain.      ACCESSION NUMBER(S):  [id number]      ORDERING CLINICIAN:  EDIN AMATO      FINDINGS:  Right knee films show a total knee arthroplasty in satisfactory  position. The patella is well positioned. No fracture is identified.  No obvious loosening or wear is appreciated.          Signed by: Edin Amato 11/28/2023 1:22 PM  Dictation workstation:   [id number]        Impression:  Status post side: right total knee arthroplasty    Plan:  Outpatient physical therapy  Follow up in  9 weeks with an x-ray  All questions answered

## 2023-12-14 LAB
CHOLESTEROL, TOTAL: 140 MG/DL
CHOLESTEROL/HDL RATIO: 3.33
ESTIMATED AVERAGE GLUCOSE: 120
HBA1C MFR BLD: 5.8 %
HDLC SERPL-MCNC: 42 MG/DL (ref 35–70)
LDL CHOLESTEROL: 81
NONHDLC SERPL-MCNC: 98 MG/DL
TRIGL SERPL-MCNC: 84 MG/DL
VLDLC SERPL CALC-MCNC: 17 MG/DL

## 2024-01-30 ENCOUNTER — OFFICE VISIT (OUTPATIENT)
Dept: ORTHOPEDIC SURGERY | Facility: CLINIC | Age: 64
End: 2024-01-30
Payer: COMMERCIAL

## 2024-01-30 ENCOUNTER — CLINICAL SUPPORT (OUTPATIENT)
Dept: ORTHOPEDIC SURGERY | Facility: CLINIC | Age: 64
End: 2024-01-30
Payer: COMMERCIAL

## 2024-01-30 DIAGNOSIS — Z96.651 S/P TOTAL KNEE ARTHROPLASTY, RIGHT: Primary | ICD-10-CM

## 2024-01-30 DIAGNOSIS — Z96.651 TOTAL KNEE REPLACEMENT STATUS, RIGHT: ICD-10-CM

## 2024-01-30 PROCEDURE — 73562 X-RAY EXAM OF KNEE 3: CPT | Mod: RIGHT SIDE | Performed by: ORTHOPAEDIC SURGERY

## 2024-01-30 PROCEDURE — 99024 POSTOP FOLLOW-UP VISIT: CPT | Performed by: ORTHOPAEDIC SURGERY

## 2024-01-30 RX ORDER — ATORVASTATIN CALCIUM 20 MG/1
1 TABLET, FILM COATED ORAL DAILY
COMMUNITY
Start: 2023-07-18

## 2024-01-30 RX ORDER — LISINOPRIL AND HYDROCHLOROTHIAZIDE 12.5; 2 MG/1; MG/1
1 TABLET ORAL
COMMUNITY
Start: 2012-11-30

## 2024-01-30 RX ORDER — MELOXICAM 15 MG/1
15 TABLET ORAL
COMMUNITY
Start: 2023-07-18 | End: 2024-07-12

## 2024-01-30 RX ORDER — TAMSULOSIN HYDROCHLORIDE 0.4 MG/1
1 CAPSULE ORAL NIGHTLY
COMMUNITY
Start: 2023-07-18

## 2024-01-30 NOTE — PROGRESS NOTES
No chief complaint on file.      The patient is here for follow-up of their side: right knee arthroplasty.  The patient has mild knee pain.  The patient has no mechanical symptoms.  The patient has no swelling.  The patient is approximately 3 month(s) postop    Physical examination:    Examination of the side: right knee  The incision is healing well  No erythema or warmth.  No instability varus or valgus stressing the knee at 0, 30 or 60 degrees.  No instability in the AP plane at 90 degrees.  Range of motion: -5 degrees extension, 120 degrees flexion  There is mild tenderness  Calf is soft, Homans negative  The patient has intact ankle dorsiflexion and plantarflexion.    Radiographs:   XR knee right 3 views  Interpreted By:  Edin Amato,   STUDY:  XR KNEE RIGHT 3 VIEWS;  ;  11/28/2023 1:06 pm      INDICATION:  Signs/Symptoms:pain.      ACCESSION NUMBER(S):  [id number]      ORDERING CLINICIAN:  EDIN AMATO      FINDINGS:  Right knee films show a total knee arthroplasty in satisfactory  position. The patella is well positioned. No fracture is identified.  No obvious loosening or wear is appreciated.          Signed by: Edin Amato 11/28/2023 1:22 PM  Dictation workstation:   [id number]        Impression:  Status post side: right total knee arthroplasty    Plan:  Discussed the importance of prophylactic dental antibiotics  Follow up in 3 months with XR   All questions answered

## 2024-03-11 ENCOUNTER — OFFICE (OUTPATIENT)
Dept: URBAN - METROPOLITAN AREA CLINIC 26 | Facility: CLINIC | Age: 64
End: 2024-03-11
Payer: COMMERCIAL

## 2024-03-11 VITALS
TEMPERATURE: 97.8 F | WEIGHT: 217 LBS | HEIGHT: 67 IN | DIASTOLIC BLOOD PRESSURE: 82 MMHG | HEART RATE: 80 BPM | SYSTOLIC BLOOD PRESSURE: 136 MMHG

## 2024-03-11 DIAGNOSIS — K60.2 ANAL FISSURE, UNSPECIFIED: ICD-10-CM

## 2024-03-11 DIAGNOSIS — Z12.11 ENCOUNTER FOR SCREENING FOR MALIGNANT NEOPLASM OF COLON: ICD-10-CM

## 2024-03-11 DIAGNOSIS — K64.9 UNSPECIFIED HEMORRHOIDS: ICD-10-CM

## 2024-03-11 DIAGNOSIS — K59.09 OTHER CONSTIPATION: ICD-10-CM

## 2024-03-11 PROCEDURE — 46600 DIAGNOSTIC ANOSCOPY SPX: CPT | Performed by: INTERNAL MEDICINE

## 2024-03-11 PROCEDURE — 99204 OFFICE O/P NEW MOD 45 MIN: CPT | Mod: 25 | Performed by: INTERNAL MEDICINE

## 2024-04-09 ENCOUNTER — OFFICE (OUTPATIENT)
Dept: URBAN - METROPOLITAN AREA CLINIC 26 | Facility: CLINIC | Age: 64
End: 2024-04-09
Payer: COMMERCIAL

## 2024-04-09 VITALS
HEIGHT: 67 IN | HEART RATE: 68 BPM | SYSTOLIC BLOOD PRESSURE: 142 MMHG | DIASTOLIC BLOOD PRESSURE: 77 MMHG | WEIGHT: 219 LBS

## 2024-04-09 DIAGNOSIS — Z12.11 ENCOUNTER FOR SCREENING FOR MALIGNANT NEOPLASM OF COLON: ICD-10-CM

## 2024-04-09 DIAGNOSIS — K64.9 UNSPECIFIED HEMORRHOIDS: ICD-10-CM

## 2024-04-09 DIAGNOSIS — K60.2 ANAL FISSURE, UNSPECIFIED: ICD-10-CM

## 2024-04-09 PROCEDURE — 99213 OFFICE O/P EST LOW 20 MIN: CPT | Performed by: NURSE PRACTITIONER

## 2024-05-06 DIAGNOSIS — Z96.651 S/P TOTAL KNEE ARTHROPLASTY, RIGHT: Primary | ICD-10-CM

## 2024-05-07 ENCOUNTER — HOSPITAL ENCOUNTER (OUTPATIENT)
Dept: RADIOLOGY | Facility: HOSPITAL | Age: 64
Discharge: HOME | End: 2024-05-07
Payer: COMMERCIAL

## 2024-05-07 ENCOUNTER — OFFICE VISIT (OUTPATIENT)
Dept: ORTHOPEDIC SURGERY | Facility: CLINIC | Age: 64
End: 2024-05-07
Payer: COMMERCIAL

## 2024-05-07 DIAGNOSIS — Z96.651 S/P TOTAL KNEE ARTHROPLASTY, RIGHT: ICD-10-CM

## 2024-05-07 DIAGNOSIS — Z96.651 TOTAL KNEE REPLACEMENT STATUS, RIGHT: Primary | ICD-10-CM

## 2024-05-07 PROCEDURE — 99213 OFFICE O/P EST LOW 20 MIN: CPT | Performed by: ORTHOPAEDIC SURGERY

## 2024-05-07 PROCEDURE — 73562 X-RAY EXAM OF KNEE 3: CPT | Mod: RT

## 2024-05-07 NOTE — PROGRESS NOTES
No chief complaint on file.      The patient is here for follow-up of their side: right knee arthroplasty.  The patient has no knee pain.  The patient has no mechanical symptoms.  The patient has mild swelling.  The patient is approximately 6 month(s) postop    Physical examination:    Examination of the side: right knee  The incision is  healed  No erythema or warmth.  No instability varus or valgus stressing the knee at 0, 30 or 60 degrees.  No instability in the AP plane at 90 degrees.  Range of motion: 0 degrees extension, 120 degrees flexion  There is no tenderness  Calf is soft, Homans negative  The patient has intact ankle dorsiflexion and plantarflexion.    Radiographs:   Right knee films show a total knee arthroplasty in satisfactory position.  The patella is well-positioned.  No fracture is identified.  No obvious loosening or wear is appreciated.        Impression:  Status post side: right total knee arthroplasty    Plan:  Discussed the importance of prophylactic dental antibiotics  Follow up in 6 months  All questions answered

## 2024-07-05 VITALS
DIASTOLIC BLOOD PRESSURE: 45 MMHG | DIASTOLIC BLOOD PRESSURE: 55 MMHG | HEART RATE: 79 BPM | HEART RATE: 76 BPM | OXYGEN SATURATION: 99 % | DIASTOLIC BLOOD PRESSURE: 55 MMHG | HEIGHT: 66 IN | DIASTOLIC BLOOD PRESSURE: 43 MMHG | HEART RATE: 78 BPM | RESPIRATION RATE: 18 BRPM | HEART RATE: 74 BPM | SYSTOLIC BLOOD PRESSURE: 94 MMHG | OXYGEN SATURATION: 97 % | DIASTOLIC BLOOD PRESSURE: 47 MMHG | SYSTOLIC BLOOD PRESSURE: 90 MMHG | DIASTOLIC BLOOD PRESSURE: 41 MMHG | WEIGHT: 210 LBS | DIASTOLIC BLOOD PRESSURE: 42 MMHG | RESPIRATION RATE: 18 BRPM | RESPIRATION RATE: 13 BRPM | RESPIRATION RATE: 14 BRPM | HEART RATE: 76 BPM | RESPIRATION RATE: 17 BRPM | OXYGEN SATURATION: 98 % | SYSTOLIC BLOOD PRESSURE: 108 MMHG | SYSTOLIC BLOOD PRESSURE: 108 MMHG | WEIGHT: 210 LBS | RESPIRATION RATE: 15 BRPM | SYSTOLIC BLOOD PRESSURE: 112 MMHG | DIASTOLIC BLOOD PRESSURE: 43 MMHG | HEART RATE: 75 BPM | DIASTOLIC BLOOD PRESSURE: 43 MMHG | HEART RATE: 77 BPM | SYSTOLIC BLOOD PRESSURE: 93 MMHG | SYSTOLIC BLOOD PRESSURE: 90 MMHG | OXYGEN SATURATION: 98 % | SYSTOLIC BLOOD PRESSURE: 101 MMHG | RESPIRATION RATE: 22 BRPM | DIASTOLIC BLOOD PRESSURE: 41 MMHG | SYSTOLIC BLOOD PRESSURE: 98 MMHG | HEART RATE: 77 BPM | DIASTOLIC BLOOD PRESSURE: 75 MMHG | HEART RATE: 69 BPM | HEART RATE: 78 BPM | HEIGHT: 66 IN | HEART RATE: 75 BPM | RESPIRATION RATE: 17 BRPM | SYSTOLIC BLOOD PRESSURE: 98 MMHG | OXYGEN SATURATION: 100 % | SYSTOLIC BLOOD PRESSURE: 95 MMHG | SYSTOLIC BLOOD PRESSURE: 93 MMHG | OXYGEN SATURATION: 100 % | RESPIRATION RATE: 16 BRPM | SYSTOLIC BLOOD PRESSURE: 95 MMHG | RESPIRATION RATE: 16 BRPM | SYSTOLIC BLOOD PRESSURE: 99 MMHG | HEART RATE: 76 BPM | RESPIRATION RATE: 17 BRPM | HEART RATE: 69 BPM | HEART RATE: 81 BPM | RESPIRATION RATE: 7 BRPM | SYSTOLIC BLOOD PRESSURE: 94 MMHG | RESPIRATION RATE: 14 BRPM | HEART RATE: 74 BPM | HEART RATE: 68 BPM | DIASTOLIC BLOOD PRESSURE: 48 MMHG | RESPIRATION RATE: 19 BRPM | RESPIRATION RATE: 7 BRPM | HEART RATE: 68 BPM | RESPIRATION RATE: 15 BRPM | SYSTOLIC BLOOD PRESSURE: 101 MMHG | RESPIRATION RATE: 13 BRPM | SYSTOLIC BLOOD PRESSURE: 102 MMHG | TEMPERATURE: 99 F | DIASTOLIC BLOOD PRESSURE: 51 MMHG | HEART RATE: 75 BPM | SYSTOLIC BLOOD PRESSURE: 94 MMHG | OXYGEN SATURATION: 99 % | DIASTOLIC BLOOD PRESSURE: 48 MMHG | RESPIRATION RATE: 19 BRPM | HEART RATE: 72 BPM | SYSTOLIC BLOOD PRESSURE: 126 MMHG | RESPIRATION RATE: 22 BRPM | SYSTOLIC BLOOD PRESSURE: 95 MMHG | HEIGHT: 66 IN | RESPIRATION RATE: 16 BRPM | RESPIRATION RATE: 19 BRPM | DIASTOLIC BLOOD PRESSURE: 47 MMHG | RESPIRATION RATE: 18 BRPM | SYSTOLIC BLOOD PRESSURE: 93 MMHG | DIASTOLIC BLOOD PRESSURE: 51 MMHG | DIASTOLIC BLOOD PRESSURE: 48 MMHG | HEART RATE: 72 BPM | SYSTOLIC BLOOD PRESSURE: 98 MMHG | SYSTOLIC BLOOD PRESSURE: 101 MMHG | TEMPERATURE: 99 F | OXYGEN SATURATION: 99 % | OXYGEN SATURATION: 100 % | SYSTOLIC BLOOD PRESSURE: 99 MMHG | SYSTOLIC BLOOD PRESSURE: 112 MMHG | SYSTOLIC BLOOD PRESSURE: 112 MMHG | DIASTOLIC BLOOD PRESSURE: 75 MMHG | SYSTOLIC BLOOD PRESSURE: 90 MMHG | SYSTOLIC BLOOD PRESSURE: 102 MMHG | HEART RATE: 78 BPM | HEART RATE: 68 BPM | TEMPERATURE: 99 F | OXYGEN SATURATION: 97 % | DIASTOLIC BLOOD PRESSURE: 55 MMHG | DIASTOLIC BLOOD PRESSURE: 51 MMHG | OXYGEN SATURATION: 98 % | DIASTOLIC BLOOD PRESSURE: 45 MMHG | HEART RATE: 81 BPM | DIASTOLIC BLOOD PRESSURE: 75 MMHG | SYSTOLIC BLOOD PRESSURE: 102 MMHG | HEART RATE: 69 BPM | SYSTOLIC BLOOD PRESSURE: 96 MMHG | SYSTOLIC BLOOD PRESSURE: 126 MMHG | RESPIRATION RATE: 15 BRPM | DIASTOLIC BLOOD PRESSURE: 42 MMHG | HEART RATE: 79 BPM | HEART RATE: 77 BPM | SYSTOLIC BLOOD PRESSURE: 126 MMHG | DIASTOLIC BLOOD PRESSURE: 45 MMHG | DIASTOLIC BLOOD PRESSURE: 42 MMHG | HEART RATE: 79 BPM | OXYGEN SATURATION: 97 % | RESPIRATION RATE: 22 BRPM | RESPIRATION RATE: 14 BRPM | HEART RATE: 74 BPM | DIASTOLIC BLOOD PRESSURE: 41 MMHG | HEART RATE: 72 BPM | SYSTOLIC BLOOD PRESSURE: 96 MMHG | WEIGHT: 210 LBS | SYSTOLIC BLOOD PRESSURE: 99 MMHG | SYSTOLIC BLOOD PRESSURE: 96 MMHG | SYSTOLIC BLOOD PRESSURE: 108 MMHG | DIASTOLIC BLOOD PRESSURE: 47 MMHG | HEART RATE: 81 BPM | RESPIRATION RATE: 7 BRPM | RESPIRATION RATE: 13 BRPM

## 2024-07-12 ENCOUNTER — AMBULATORY SURGICAL CENTER (OUTPATIENT)
Dept: URBAN - METROPOLITAN AREA SURGERY 12 | Facility: SURGERY | Age: 64
End: 2024-07-12
Payer: COMMERCIAL

## 2024-07-12 DIAGNOSIS — K57.30 DIVERTICULOSIS OF LARGE INTESTINE WITHOUT PERFORATION OR ABS: ICD-10-CM

## 2024-07-12 DIAGNOSIS — K63.5 POLYP OF COLON: ICD-10-CM

## 2024-07-12 DIAGNOSIS — K64.8 OTHER HEMORRHOIDS: ICD-10-CM

## 2024-07-12 DIAGNOSIS — Z12.11 ENCOUNTER FOR SCREENING FOR MALIGNANT NEOPLASM OF COLON: ICD-10-CM

## 2024-07-12 DIAGNOSIS — K60.2 ANAL FISSURE, UNSPECIFIED: ICD-10-CM

## 2024-07-12 DIAGNOSIS — K64.4 RESIDUAL HEMORRHOIDAL SKIN TAGS: ICD-10-CM

## 2024-07-12 DIAGNOSIS — K62.1 RECTAL POLYP: ICD-10-CM

## 2024-07-12 DIAGNOSIS — D12.3 BENIGN NEOPLASM OF TRANSVERSE COLON: ICD-10-CM

## 2024-07-12 PROCEDURE — 45380 COLONOSCOPY AND BIOPSY: CPT | Mod: 33 | Performed by: INTERNAL MEDICINE

## 2024-09-06 ENCOUNTER — TELEPHONE (OUTPATIENT)
Dept: FAMILY MEDICINE CLINIC | Age: 64
End: 2024-09-06

## 2024-09-06 ENCOUNTER — OFFICE VISIT (OUTPATIENT)
Dept: FAMILY MEDICINE CLINIC | Age: 64
End: 2024-09-06
Payer: COMMERCIAL

## 2024-09-06 ENCOUNTER — HOSPITAL ENCOUNTER (OUTPATIENT)
Age: 64
Setting detail: SPECIMEN
Discharge: HOME | End: 2024-09-06

## 2024-09-06 VITALS
WEIGHT: 199 LBS | OXYGEN SATURATION: 98 % | SYSTOLIC BLOOD PRESSURE: 122 MMHG | HEIGHT: 63 IN | DIASTOLIC BLOOD PRESSURE: 86 MMHG | BODY MASS INDEX: 35.26 KG/M2 | TEMPERATURE: 55 F

## 2024-09-06 DIAGNOSIS — Z12.5 PROSTATE CANCER SCREENING: ICD-10-CM

## 2024-09-06 DIAGNOSIS — Z00.00 ANNUAL PHYSICAL EXAM: ICD-10-CM

## 2024-09-06 DIAGNOSIS — E78.5 HYPERLIPIDEMIA, UNSPECIFIED HYPERLIPIDEMIA TYPE: ICD-10-CM

## 2024-09-06 DIAGNOSIS — N40.1 BENIGN PROSTATIC HYPERPLASIA WITH URINARY HESITANCY: ICD-10-CM

## 2024-09-06 DIAGNOSIS — I10 PRIMARY HYPERTENSION: ICD-10-CM

## 2024-09-06 DIAGNOSIS — Z00.00 ANNUAL PHYSICAL EXAM: Primary | ICD-10-CM

## 2024-09-06 DIAGNOSIS — Z96.652 STATUS POST TOTAL LEFT KNEE REPLACEMENT: ICD-10-CM

## 2024-09-06 DIAGNOSIS — R39.11 BENIGN PROSTATIC HYPERPLASIA WITH URINARY HESITANCY: ICD-10-CM

## 2024-09-06 PROBLEM — R73.03 PREDIABETES: Status: ACTIVE | Noted: 2019-10-23

## 2024-09-06 LAB
ALBUMIN SERPL-MCNC: 4.6 G/DL (ref 3.5–4.6)
ALP SERPL-CCNC: 72 U/L (ref 35–104)
ALT SERPL-CCNC: 17 U/L (ref 0–41)
ANION GAP SERPL CALCULATED.3IONS-SCNC: 13 MEQ/L (ref 9–15)
AST SERPL-CCNC: 21 U/L (ref 0–40)
BILIRUB SERPL-MCNC: 1.4 MG/DL (ref 0.2–0.7)
BUN SERPL-MCNC: 18 MG/DL (ref 8–23)
CALCIUM SERPL-MCNC: 10 MG/DL (ref 8.5–9.9)
CHLORIDE SERPL-SCNC: 101 MEQ/L (ref 95–107)
CHOLEST SERPL-MCNC: 122 MG/DL (ref 0–199)
CO2 SERPL-SCNC: 25 MEQ/L (ref 20–31)
CREAT SERPL-MCNC: 1.1 MG/DL (ref 0.7–1.2)
GLOBULIN SER CALC-MCNC: 2.6 G/DL (ref 2.3–3.5)
GLUCOSE FASTING: 83 MG/DL (ref 70–99)
HDLC SERPL-MCNC: 42 MG/DL (ref 40–59)
LDL CHOLESTEROL: 66 MG/DL (ref 0–129)
POTASSIUM SERPL-SCNC: 4.6 MEQ/L (ref 3.4–4.9)
PROT SERPL-MCNC: 7.2 G/DL (ref 6.3–8)
PSA SERPL-MCNC: 1.04 NG/ML (ref 0–4)
SODIUM SERPL-SCNC: 139 MEQ/L (ref 135–144)
TRIGLYCERIDE, FASTING: 68 MG/DL (ref 0–150)

## 2024-09-06 PROCEDURE — 36415 COLL VENOUS BLD VENIPUNCTURE: CPT | Performed by: FAMILY MEDICINE

## 2024-09-06 PROCEDURE — 99386 PREV VISIT NEW AGE 40-64: CPT | Performed by: FAMILY MEDICINE

## 2024-09-06 PROCEDURE — 3074F SYST BP LT 130 MM HG: CPT | Performed by: FAMILY MEDICINE

## 2024-09-06 PROCEDURE — 3079F DIAST BP 80-89 MM HG: CPT | Performed by: FAMILY MEDICINE

## 2024-09-06 RX ORDER — ATORVASTATIN CALCIUM 20 MG/1
20 TABLET, FILM COATED ORAL DAILY
Qty: 90 TABLET | Refills: 3 | Status: SHIPPED | OUTPATIENT
Start: 2024-09-06

## 2024-09-06 RX ORDER — MELOXICAM 15 MG/1
15 TABLET ORAL DAILY PRN
Qty: 30 TABLET | Refills: 1 | Status: SHIPPED | OUTPATIENT
Start: 2024-09-06

## 2024-09-06 RX ORDER — VALACYCLOVIR HYDROCHLORIDE 1 G/1
2 TABLET, FILM COATED ORAL 2 TIMES DAILY PRN
COMMUNITY
Start: 2024-03-26

## 2024-09-06 RX ORDER — LISINOPRIL AND HYDROCHLOROTHIAZIDE 12.5; 2 MG/1; MG/1
1 TABLET ORAL DAILY
Qty: 90 TABLET | Refills: 3 | Status: SHIPPED | OUTPATIENT
Start: 2024-09-06

## 2024-09-06 SDOH — ECONOMIC STABILITY: FOOD INSECURITY: WITHIN THE PAST 12 MONTHS, YOU WORRIED THAT YOUR FOOD WOULD RUN OUT BEFORE YOU GOT MONEY TO BUY MORE.: NEVER TRUE

## 2024-09-06 SDOH — ECONOMIC STABILITY: FOOD INSECURITY: WITHIN THE PAST 12 MONTHS, THE FOOD YOU BOUGHT JUST DIDN'T LAST AND YOU DIDN'T HAVE MONEY TO GET MORE.: NEVER TRUE

## 2024-09-06 SDOH — ECONOMIC STABILITY: INCOME INSECURITY: HOW HARD IS IT FOR YOU TO PAY FOR THE VERY BASICS LIKE FOOD, HOUSING, MEDICAL CARE, AND HEATING?: NOT HARD AT ALL

## 2024-09-06 ASSESSMENT — ENCOUNTER SYMPTOMS
RHINORRHEA: 0
DIARRHEA: 0
CONSTIPATION: 0
SHORTNESS OF BREATH: 0
WHEEZING: 0
COUGH: 0
ABDOMINAL PAIN: 0
SORE THROAT: 0

## 2024-09-06 ASSESSMENT — PATIENT HEALTH QUESTIONNAIRE - PHQ9
SUM OF ALL RESPONSES TO PHQ QUESTIONS 1-9: 0
SUM OF ALL RESPONSES TO PHQ QUESTIONS 1-9: 0
SUM OF ALL RESPONSES TO PHQ9 QUESTIONS 1 & 2: 0
1. LITTLE INTEREST OR PLEASURE IN DOING THINGS: NOT AT ALL
SUM OF ALL RESPONSES TO PHQ QUESTIONS 1-9: 0
SUM OF ALL RESPONSES TO PHQ QUESTIONS 1-9: 0
2. FEELING DOWN, DEPRESSED OR HOPELESS: NOT AT ALL

## 2024-09-06 NOTE — PROGRESS NOTES
[facility]  [address] [place] [id number]  Dept: [phone]  Dept Fax: [phone]     Chief Complaint:  Chief Complaint   Patient presents with    Annual Exam     Patient would like to establish care. Patient is fasting for blood work. He did have blood work done in December 2023. He does have new insurance since then.       Vitals:    09/06/24 0943   BP: 122/86   Temp: (!) 55 °F (12.8 °C)   SpO2: 98%   Weight: 90.3 kg (199 lb)   Height: 1.6 m (5' 3\")       HPI:  63 y.o.male who presents for the following:  (Establish)    Works installing garage doors; works on ladders much; never a smoker; has had multiple hernias repaired    Gets oral cold sores; takes valtrex intermittently    Had colonoscopy 7/2024; thinks it was with [facility]    -----------------------------------------------------------------------------    Assessment/Plan:  63 y.o. male here mainly for the following:  Annual  routine labs and screenings   BPH  He is unsure but thinks his flomax was replaced with something else; he plans to call in with the meds he is on     Diagnosis Orders   1. Annual physical exam  Comprehensive Metabolic Panel, Fasting    Lipid, Fasting      2. Prostate cancer screening  PSA Screening      3. Benign prostatic hyperplasia with urinary hesitancy        4. Primary hypertension  lisinopril-hydroCHLOROthiazide (PRINZIDE;ZESTORETIC) 20-12.5 MG per tablet      5. Hyperlipidemia, unspecified hyperlipidemia type  atorvastatin (LIPITOR) 20 MG tablet      6. Status post total left knee replacement  meloxicam (MOBIC) 15 MG tablet           Return in about 1 year (around 9/6/2025) for annual exam.    Chucky Ruiz MD      -----------------------------------------------------------------------------      Objective     Physical Exam:  Physical Exam  Vitals reviewed.   Constitutional:       General: He is not in acute distress.     Appearance: He is well-developed.   HENT:      Head: Normocephalic and

## 2024-10-14 ENCOUNTER — TELEPHONE (OUTPATIENT)
Dept: FAMILY MEDICINE CLINIC | Age: 64
End: 2024-10-14

## 2024-10-14 DIAGNOSIS — N50.89 PAIN OF MALE GENITALIA: Primary | ICD-10-CM

## 2024-10-14 NOTE — TELEPHONE ENCOUNTER
Patient dropped off his signed medical release form for his CRC results, Saw Dr Burns for this. Patient is asking for a referral for a Urologist, before his insurance starts back over for the deductible at the beginning of the year. Can one be placed or will patient need to be seen for an appt? Had physical done on 9/6/2024?

## 2024-10-17 NOTE — TELEPHONE ENCOUNTER
Called patient and he states this was discussed at his last visit and you had told him that it was beyond your ability to care and offered a referral to urology at that time. He says he had told you that he would think about it. He doesn't feel another appointment would be needed.

## 2024-10-17 NOTE — TELEPHONE ENCOUNTER
Patient states that he has no feeling in his private parts and he wants to know if that's a normal thing when he gets older.

## 2024-10-21 ENCOUNTER — TELEPHONE (OUTPATIENT)
Dept: FAMILY MEDICINE CLINIC | Age: 64
End: 2024-10-21

## 2024-10-21 DIAGNOSIS — R20.0 PERINEAL NUMBNESS: Primary | ICD-10-CM

## 2024-11-07 ENCOUNTER — TELEPHONE (OUTPATIENT)
Dept: FAMILY MEDICINE CLINIC | Age: 64
End: 2024-11-07

## 2024-11-07 DIAGNOSIS — R20.0 PERINEAL NUMBNESS: Primary | ICD-10-CM

## 2024-11-07 NOTE — TELEPHONE ENCOUNTER
----- Message from Violeta RODRIGUEZ sent at 11/6/2024  4:15 PM EST -----  Regarding: [facility] Referral Request  [facility] Referral Request    Reason for referral request: Specialty Provider    Specialist/Lab/Test patient is requesting (if known):  PT need a Urologist    Specialist Phone Number (if applicable):    Additional Information the practice send a wrong referral because pt need a Urologist and not a Neurologist  --------------------------------------------------------------------------------------------------------------------------    Relationship to Patient: Self     Call Back Information: OK to leave message on voicemail  Preferred Call Back Number: Phone

## 2024-11-07 NOTE — TELEPHONE ENCOUNTER
Called patient and explained that urologist Dr. Brown told us that he needs to see neurology for his issue. Patient insists that this is incorrect, that there is a confusion, he needs a referral to urology and says to forget it for now and he will do something about his issue after the first of the year. He did mention having an appointment scheduled with urologist in [place] already.

## 2024-11-11 DIAGNOSIS — Z96.651 TOTAL KNEE REPLACEMENT STATUS, RIGHT: Primary | ICD-10-CM

## 2024-11-12 ENCOUNTER — APPOINTMENT (OUTPATIENT)
Dept: ORTHOPEDIC SURGERY | Facility: CLINIC | Age: 64
End: 2024-11-12
Payer: COMMERCIAL

## 2024-11-12 ENCOUNTER — HOSPITAL ENCOUNTER (OUTPATIENT)
Dept: RADIOLOGY | Facility: HOSPITAL | Age: 64
Discharge: HOME | End: 2024-11-12
Payer: COMMERCIAL

## 2024-11-12 DIAGNOSIS — M17.11 PRIMARY OSTEOARTHRITIS OF RIGHT KNEE: Primary | ICD-10-CM

## 2024-11-12 DIAGNOSIS — Z96.651 TOTAL KNEE REPLACEMENT STATUS, RIGHT: ICD-10-CM

## 2024-11-12 PROCEDURE — L1812 KO ELASTIC W/JOINTS PRE OTS: HCPCS | Performed by: ORTHOPAEDIC SURGERY

## 2024-11-12 PROCEDURE — 73562 X-RAY EXAM OF KNEE 3: CPT | Mod: RIGHT SIDE | Performed by: RADIOLOGY

## 2024-11-12 PROCEDURE — 99213 OFFICE O/P EST LOW 20 MIN: CPT | Performed by: ORTHOPAEDIC SURGERY

## 2024-11-12 PROCEDURE — 73562 X-RAY EXAM OF KNEE 3: CPT | Mod: RT

## 2024-11-12 NOTE — PROGRESS NOTES
No chief complaint on file.      The patient is here for follow-up of their side: right knee arthroplasty.  The patient has mild knee pain.  The patient has mild mechanical symptoms.  The patient has moderate swelling.  The patient is approximately 1 year(s) postop    Physical examination:    Examination of the side: right knee  The incision is  healed  No erythema or warmth.  Moderate instability varus or valgus stressing the knee at 0, 30 or 60 degrees.  Moderate instability in the AP plane at 90 degrees.  Range of motion: 0 degrees extension, 130 degrees flexion  There is no tenderness  Calf is soft, Homans negative  The patient has intact ankle dorsiflexion and plantarflexion.    Radiographs:   XR knee right 3 views  Narrative: Interpreted By:  Viola Caruso,   STUDY:  Right knee, three views. Left knee, two views.      INDICATION:  Signs/Symptoms:pain.      COMPARISON:  01/30/2024.      ACCESSION NUMBER(S):  [id number]      ORDERING CLINICIAN:  YASMIN AVERY      FINDINGS:  Right knee:  No acute fracture or malalignment.  Status post right total knee arthroplasty.  Hardware is intact without perihardware fractures or lucencies.  Small joint effusion.  Nonspecific anterior knee prepatellar soft tissue swelling which may  be reactive in etiology.          AP view radiographs of the left knee demonstrate changes of total  knee arthroplasty with intact hardware.      Impression: 1. As above.      Signed by: Viola Caruso 5/10/2024 5:12 PM  Dictation workstation:   [id number]        Impression:  Status post side: right total knee arthroplasty  Knee instability    Plan:  Discussed the importance of prophylactic dental antibiotics  I would like to try a hinged knee brace.  He may need a polyethylene exchange.  Follow up in 1 month  All questions answered

## 2024-11-20 ENCOUNTER — TELEPHONE (OUTPATIENT)
Dept: FAMILY MEDICINE CLINIC | Age: 64
End: 2024-11-20

## 2024-11-20 NOTE — TELEPHONE ENCOUNTER
Pt called wanting to know his PSA results from 9/6/2024, let him know they were 1.04, will mail copy to pt because he wants to take them to his urologist.

## 2024-12-13 ENCOUNTER — OFFICE (OUTPATIENT)
Dept: URBAN - METROPOLITAN AREA CLINIC 26 | Facility: CLINIC | Age: 64
End: 2024-12-13
Payer: COMMERCIAL

## 2024-12-13 VITALS
HEIGHT: 66 IN | TEMPERATURE: 97.9 F | WEIGHT: 188 LBS | SYSTOLIC BLOOD PRESSURE: 117 MMHG | DIASTOLIC BLOOD PRESSURE: 71 MMHG | HEART RATE: 59 BPM

## 2024-12-13 DIAGNOSIS — Z86.0101 PERSONAL HISTORY OF ADENOMATOUS AND SERRATED COLON POLYPS: ICD-10-CM

## 2024-12-13 DIAGNOSIS — K64.9 UNSPECIFIED HEMORRHOIDS: ICD-10-CM

## 2024-12-13 DIAGNOSIS — K59.09 OTHER CONSTIPATION: ICD-10-CM

## 2024-12-13 DIAGNOSIS — K60.2 ANAL FISSURE, UNSPECIFIED: ICD-10-CM

## 2024-12-13 PROCEDURE — 99214 OFFICE O/P EST MOD 30 MIN: CPT | Performed by: INTERNAL MEDICINE

## 2024-12-17 ENCOUNTER — APPOINTMENT (OUTPATIENT)
Dept: ORTHOPEDIC SURGERY | Facility: CLINIC | Age: 64
End: 2024-12-17
Payer: COMMERCIAL

## 2024-12-17 DIAGNOSIS — M25.361 KNEE INSTABILITY, RIGHT: ICD-10-CM

## 2024-12-17 DIAGNOSIS — Z96.651 TOTAL KNEE REPLACEMENT STATUS, RIGHT: Primary | ICD-10-CM

## 2024-12-17 PROCEDURE — 99213 OFFICE O/P EST LOW 20 MIN: CPT | Performed by: ORTHOPAEDIC SURGERY

## 2024-12-17 NOTE — PROGRESS NOTES
No chief complaint on file.      The patient is here for follow-up of their side: right knee arthroplasty.  The patient has mild knee pain.  The patient has moderate mechanical symptoms.  The patient has mild swelling.  The patient is approximately 1 year(s) postop.  I given him a hinged knee brace for knee instability.  He is here for recheck.    The brace helps some    Physical examination:    Examination of the side: right knee  The incision is  healed  No erythema or warmth.  Moderate instability varus or valgus stressing the knee at 0, 30 or 60 degrees.  No instability in the AP plane at 90 degrees.  Range of motion: 0 degrees extension, 130 degrees flexion  There is no tenderness  Calf is soft, Homans negative  The patient has intact ankle dorsiflexion and plantarflexion.    Radiographs:   XR knee right 3 views  Narrative: Interpreted By:  Phil Cross,   STUDY:  XR KNEE RIGHT 3 VIEWS; 11/12/2024 9:53 am      INDICATION:  Signs/Symptoms:pain.      COMPARISON:  None.      ACCESSION NUMBER(S):  [id number]      ORDERING CLINICIAN:  CHAPIN MARTINI      FINDINGS:  No acute fracture or dislocation. No lytic or blastic lesions or  periosteal reaction. Bilateral total knee arthroplasties with intact  alignment.      Impression: No acute fracture or dislocation. Bilateral total knee arthroplasties  with intact alignment.          Signed by: Phil Cross 11/15/2024 12:47 PM  Dictation workstation:   [id number]        Impression:  Status post side: right total knee arthroplasty  Right knee instability    Plan:  Discussed the importance of prophylactic dental antibiotics  We discussed polyethylene exchange which I think would help.  He would like to proceed with this in the spring  Follow up in  March and we will plan surgical intervention  All questions answered

## 2024-12-30 ENCOUNTER — LAB (OUTPATIENT)
Dept: LAB | Facility: LAB | Age: 64
End: 2024-12-30
Payer: COMMERCIAL

## 2024-12-30 DIAGNOSIS — E53.8 DEFICIENCY OF OTHER SPECIFIED B GROUP VITAMINS: Primary | ICD-10-CM

## 2024-12-30 DIAGNOSIS — E03.9 HYPOTHYROIDISM, UNSPECIFIED: ICD-10-CM

## 2024-12-30 DIAGNOSIS — R73.09 OTHER ABNORMAL GLUCOSE: ICD-10-CM

## 2024-12-30 LAB
EST. AVERAGE GLUCOSE BLD GHB EST-MCNC: 105 MG/DL
FOLATE SERPL-MCNC: 13.1 NG/ML
HBA1C MFR BLD: 5.3 %
T4 SERPL-MCNC: 6.7 UG/DL (ref 4.5–11.1)
TSH SERPL-ACNC: 1.04 MIU/L (ref 0.44–3.98)
VIT B12 SERPL-MCNC: 493 PG/ML (ref 211–911)

## 2024-12-30 PROCEDURE — 82746 ASSAY OF FOLIC ACID SERUM: CPT

## 2024-12-30 PROCEDURE — 84443 ASSAY THYROID STIM HORMONE: CPT

## 2024-12-30 PROCEDURE — 83036 HEMOGLOBIN GLYCOSYLATED A1C: CPT

## 2024-12-30 PROCEDURE — 84436 ASSAY OF TOTAL THYROXINE: CPT

## 2024-12-30 PROCEDURE — 82607 VITAMIN B-12: CPT

## 2025-01-07 ENCOUNTER — HOSPITAL ENCOUNTER (OUTPATIENT)
Dept: NEUROLOGY | Facility: HOSPITAL | Age: 65
Discharge: HOME | End: 2025-01-07
Payer: COMMERCIAL

## 2025-01-07 DIAGNOSIS — G99.0 AUTONOMIC NEUROPATHY IN DISEASES CLASSIFIED ELSEWHERE: ICD-10-CM

## 2025-01-07 DIAGNOSIS — R20.2 PARESTHESIA OF SKIN: ICD-10-CM

## 2025-01-07 PROCEDURE — 95885 MUSC TST DONE W/NERV TST LIM: CPT

## 2025-01-16 ENCOUNTER — TELEPHONE (OUTPATIENT)
Dept: FAMILY MEDICINE CLINIC | Age: 65
End: 2025-01-16

## 2025-01-16 DIAGNOSIS — E78.5 DYSLIPIDEMIA: Primary | ICD-10-CM

## 2025-01-16 RX ORDER — LOVASTATIN 20 MG/1
20 TABLET ORAL NIGHTLY
Qty: 90 TABLET | Refills: 3 | Status: SHIPPED | OUTPATIENT
Start: 2025-01-16

## 2025-01-16 NOTE — TELEPHONE ENCOUNTER
Received fax request from insurance to change Atorvastatin to Lovastatin as a cost saving measure for the patient. Contacted patient to verify this is something he would be interested in and he states as long as you think this is a good move, he is ok with it. Not pended due to unknown new dosing.

## 2025-02-27 DIAGNOSIS — Z96.651 TOTAL KNEE REPLACEMENT STATUS, RIGHT: Primary | ICD-10-CM

## 2025-03-03 NOTE — PROGRESS NOTES
No chief complaint on file.      The patient is here for follow-up of their side: right knee arthroplasty.  The patient has no knee pain.  The patient has no instability.  The patient has  intermittent  swelling.  The patient is approximately 1-1/2 year(s) postop    Physical examination:    Examination of the side: right knee  The incision is  healed  No erythema or warmth.  There is instability varus and valgus stressing the knee at 0, 30 or 60 degrees.  There is instability in the AP plane at 90 degrees.  Range of motion: 0 degrees extension, 130 degrees flexion  There is no tenderness  Calf is soft, Homans negative  The patient has intact ankle dorsiflexion and plantarflexion.    Radiographs:   See dictated report      Impression:  Status post side: right total knee arthroplasty  Right knee instability    Plan:  Polyethylene exchange right knee.  The procedure was explained along with the risks, benefits and alternatives being reviewed.  Questions were answered  He may want to proceed in May.  He has personal issues he must attend to.  Contact the office and let us know.  I would obtain a preoperative medical clearance the procedure was explained along the risks, benefits alternatives being reviewed.  Potential risks including but not limited to infection, neurovascular complication,  Continued pain and/or instability as well as potential need for reoperation and/or revision surgery were all discussed with the patient.  To the procedure.  Follow up in  postoperatively  All questions answered

## 2025-03-04 ENCOUNTER — APPOINTMENT (OUTPATIENT)
Dept: ORTHOPEDIC SURGERY | Facility: CLINIC | Age: 65
End: 2025-03-04
Payer: COMMERCIAL

## 2025-03-04 ENCOUNTER — HOSPITAL ENCOUNTER (OUTPATIENT)
Dept: RADIOLOGY | Facility: HOSPITAL | Age: 65
Discharge: HOME | End: 2025-03-04
Payer: COMMERCIAL

## 2025-03-04 DIAGNOSIS — Z96.651 TOTAL KNEE REPLACEMENT STATUS, RIGHT: ICD-10-CM

## 2025-03-04 DIAGNOSIS — M25.361 KNEE INSTABILITY, RIGHT: ICD-10-CM

## 2025-03-04 DIAGNOSIS — Z96.651 TOTAL KNEE REPLACEMENT STATUS, RIGHT: Primary | ICD-10-CM

## 2025-03-04 PROCEDURE — 99214 OFFICE O/P EST MOD 30 MIN: CPT | Performed by: ORTHOPAEDIC SURGERY

## 2025-03-04 PROCEDURE — 73562 X-RAY EXAM OF KNEE 3: CPT | Mod: RIGHT SIDE | Performed by: RADIOLOGY

## 2025-03-04 PROCEDURE — 73562 X-RAY EXAM OF KNEE 3: CPT | Mod: RT

## 2025-03-05 ENCOUNTER — TELEPHONE (OUTPATIENT)
Dept: ORTHOPEDIC SURGERY | Facility: CLINIC | Age: 65
End: 2025-03-05
Payer: COMMERCIAL

## 2025-03-05 NOTE — TELEPHONE ENCOUNTER
Patient is having a poly swap / polyethylene exchange his current implants are emilee persona F:7 poly 14mm, t:f petella 38     Wanting to know if his current implants had a recall or why they would of broken down as fast as It did

## 2025-03-11 PROBLEM — T84.032A MECHANICAL LOOSENING OF INTERNAL RIGHT KNEE PROSTHETIC JOINT: Status: ACTIVE | Noted: 2025-03-11

## 2025-03-11 PROBLEM — T84.022A INSTABILITY OF INTERNAL RIGHT KNEE PROSTHESIS: Status: ACTIVE | Noted: 2025-03-11

## 2025-04-11 ENCOUNTER — TELEPHONE (OUTPATIENT)
Dept: ORTHOPEDIC SURGERY | Facility: CLINIC | Age: 65
End: 2025-04-11
Payer: COMMERCIAL

## 2025-04-11 NOTE — TELEPHONE ENCOUNTER
LVM about needing a walker for surgery. Left instructions & bracing dept. #[id number] so PT can confirm if PT has a walker or not

## 2025-04-15 ENCOUNTER — TELEPHONE (OUTPATIENT)
Dept: ORTHOPEDIC SURGERY | Facility: CLINIC | Age: 65
End: 2025-04-15
Payer: COMMERCIAL

## 2025-04-15 NOTE — TELEPHONE ENCOUNTER
PT called to notify DME there is a walker in the household from a previous procedure. All set to go for surgery   No

## 2025-04-22 DIAGNOSIS — Z96.651 TOTAL KNEE REPLACEMENT STATUS, RIGHT: Primary | ICD-10-CM

## 2025-04-22 DIAGNOSIS — M25.361 KNEE INSTABILITY, RIGHT: ICD-10-CM

## 2025-04-28 ENCOUNTER — PRE-ADMISSION TESTING (OUTPATIENT)
Dept: PREADMISSION TESTING | Facility: HOSPITAL | Age: 65
End: 2025-04-28
Payer: COMMERCIAL

## 2025-04-28 ENCOUNTER — HOSPITAL ENCOUNTER (OUTPATIENT)
Dept: CARDIOLOGY | Facility: HOSPITAL | Age: 65
Discharge: HOME | End: 2025-04-28
Payer: COMMERCIAL

## 2025-04-28 VITALS
BODY MASS INDEX: 30.6 KG/M2 | DIASTOLIC BLOOD PRESSURE: 69 MMHG | HEART RATE: 66 BPM | WEIGHT: 183.64 LBS | RESPIRATION RATE: 18 BRPM | OXYGEN SATURATION: 99 % | HEIGHT: 65 IN | SYSTOLIC BLOOD PRESSURE: 130 MMHG

## 2025-04-28 DIAGNOSIS — T84.022A INSTABILITY OF INTERNAL RIGHT KNEE PROSTHESIS, INITIAL ENCOUNTER: ICD-10-CM

## 2025-04-28 DIAGNOSIS — T84.032A MECHANICAL LOOSENING OF INTERNAL RIGHT KNEE PROSTHETIC JOINT, INITIAL ENCOUNTER: ICD-10-CM

## 2025-04-28 LAB
ALBUMIN SERPL BCP-MCNC: 4.4 G/DL (ref 3.4–5)
ALP SERPL-CCNC: 59 U/L (ref 33–136)
ALT SERPL W P-5'-P-CCNC: 17 U/L (ref 10–52)
ANION GAP SERPL CALC-SCNC: 10 MMOL/L (ref 10–20)
AST SERPL W P-5'-P-CCNC: 18 U/L (ref 9–39)
ATRIAL RATE: 56 BPM
BASOPHILS # BLD AUTO: 0.02 X10*3/UL (ref 0–0.1)
BASOPHILS NFR BLD AUTO: 0.4 %
BILIRUB SERPL-MCNC: 1.7 MG/DL (ref 0–1.2)
BUN SERPL-MCNC: 21 MG/DL (ref 6–23)
CALCIUM SERPL-MCNC: 9.6 MG/DL (ref 8.6–10.3)
CHLORIDE SERPL-SCNC: 103 MMOL/L (ref 98–107)
CO2 SERPL-SCNC: 28 MMOL/L (ref 21–32)
CREAT SERPL-MCNC: 1.04 MG/DL (ref 0.5–1.3)
CRP SERPL-MCNC: 0.29 MG/DL
EGFRCR SERPLBLD CKD-EPI 2021: 81 ML/MIN/1.73M*2
EOSINOPHIL # BLD AUTO: 0.13 X10*3/UL (ref 0–0.7)
EOSINOPHIL NFR BLD AUTO: 2.6 %
ERYTHROCYTE [DISTWIDTH] IN BLOOD BY AUTOMATED COUNT: 13 % (ref 11.5–14.5)
ERYTHROCYTE [SEDIMENTATION RATE] IN BLOOD BY WESTERGREN METHOD: 6 MM/H (ref 0–20)
EST. AVERAGE GLUCOSE BLD GHB EST-MCNC: 103 MG/DL
GLUCOSE SERPL-MCNC: 107 MG/DL (ref 74–99)
HBA1C MFR BLD: 5.2 % (ref ?–5.7)
HCT VFR BLD AUTO: 39.9 % (ref 41–52)
HGB BLD-MCNC: 13.2 G/DL (ref 13.5–17.5)
IMM GRANULOCYTES # BLD AUTO: 0.02 X10*3/UL (ref 0–0.7)
IMM GRANULOCYTES NFR BLD AUTO: 0.4 % (ref 0–0.9)
INR PPP: 1 (ref 0.9–1.1)
LYMPHOCYTES # BLD AUTO: 1.11 X10*3/UL (ref 1.2–4.8)
LYMPHOCYTES NFR BLD AUTO: 22.1 %
MCH RBC QN AUTO: 28.1 PG (ref 26–34)
MCHC RBC AUTO-ENTMCNC: 33.1 G/DL (ref 32–36)
MCV RBC AUTO: 85 FL (ref 80–100)
MONOCYTES # BLD AUTO: 0.46 X10*3/UL (ref 0.1–1)
MONOCYTES NFR BLD AUTO: 9.1 %
NEUTROPHILS # BLD AUTO: 3.29 X10*3/UL (ref 1.2–7.7)
NEUTROPHILS NFR BLD AUTO: 65.4 %
NRBC BLD-RTO: 0 /100 WBCS (ref 0–0)
P AXIS: 43 DEGREES
P OFFSET: 193 MS
P ONSET: 133 MS
PLATELET # BLD AUTO: 193 X10*3/UL (ref 150–450)
POTASSIUM SERPL-SCNC: 4.2 MMOL/L (ref 3.5–5.3)
PR INTERVAL: 180 MS
PROT SERPL-MCNC: 6.9 G/DL (ref 6.4–8.2)
PROTHROMBIN TIME: 10.5 SECONDS (ref 9.8–12.4)
Q ONSET: 223 MS
QRS COUNT: 10 BEATS
QRS DURATION: 92 MS
QT INTERVAL: 398 MS
QTC CALCULATION(BAZETT): 384 MS
QTC FREDERICIA: 389 MS
R AXIS: -8 DEGREES
RBC # BLD AUTO: 4.69 X10*6/UL (ref 4.5–5.9)
SODIUM SERPL-SCNC: 137 MMOL/L (ref 136–145)
T AXIS: 22 DEGREES
T OFFSET: 422 MS
VENTRICULAR RATE: 56 BPM
WBC # BLD AUTO: 5 X10*3/UL (ref 4.4–11.3)

## 2025-04-28 PROCEDURE — 93010 ELECTROCARDIOGRAM REPORT: CPT | Performed by: INTERNAL MEDICINE

## 2025-04-28 PROCEDURE — 83036 HEMOGLOBIN GLYCOSYLATED A1C: CPT | Mod: ELYLAB

## 2025-04-28 PROCEDURE — 93005 ELECTROCARDIOGRAM TRACING: CPT

## 2025-04-28 PROCEDURE — 36415 COLL VENOUS BLD VENIPUNCTURE: CPT

## 2025-04-28 PROCEDURE — 80053 COMPREHEN METABOLIC PANEL: CPT

## 2025-04-28 PROCEDURE — 85610 PROTHROMBIN TIME: CPT

## 2025-04-28 PROCEDURE — 85652 RBC SED RATE AUTOMATED: CPT

## 2025-04-28 PROCEDURE — 86140 C-REACTIVE PROTEIN: CPT

## 2025-04-28 PROCEDURE — 87081 CULTURE SCREEN ONLY: CPT | Mod: ELYLAB

## 2025-04-28 PROCEDURE — 85025 COMPLETE CBC W/AUTO DIFF WBC: CPT

## 2025-04-28 RX ORDER — MELOXICAM 15 MG/1
1 TABLET ORAL DAILY PRN
COMMUNITY
Start: 2024-07-12

## 2025-04-28 RX ORDER — ACETAMINOPHEN 500 MG
500 TABLET ORAL EVERY 6 HOURS PRN
COMMUNITY

## 2025-04-28 ASSESSMENT — PAIN - FUNCTIONAL ASSESSMENT: PAIN_FUNCTIONAL_ASSESSMENT: 0-10

## 2025-04-28 ASSESSMENT — PAIN SCALES - GENERAL: PAINLEVEL_OUTOF10: 0 - NO PAIN

## 2025-04-28 NOTE — PREPROCEDURE INSTRUCTIONS
KNEE & HIP JOINT REPLACEMENT PRE-OPERATIVE INSTRUCTIONS     You will receive notification one business day prior to your surgery to confirm your arrival time and any additional information between 2 P.M. - 5 P.M. It is important that you answer your phone and/or check your messages during this time.     You may see in [facility] your surgery start time change several times even up to the day before your procedure. Please disregard those times and only follow the time given by the  who will be notifying you via phone and not a text.     Please arrive at your scheduled time to avoid delay or cancelled surgery.     Please enter the building through either the Main Entrance in front of the hospital or from the Parking Garage Walk Way Bridge. From the parking garage, which is free, take the 2nd Floor Walkway Bridge into the hospital and check in at the [facility] Outpatient Desk as you enter the hospital directly in front of you. If you enter through the Main Entrance take the elevator off the lobby on the right labeled “A” to the 2nd floor and check in at the [facility] Outpatient Surgery desk as you exit the elevator. Wheelchairs are available for use if using the Main Entrance.     Handicap parking in the land lot, in front of hospital by main entrance, wheelchairs are available at this entrance     ?   INSTRUCTIONS:   Please contact your doctor’s office, who is doing procedure, about any changes in your health, bad cold, fever, sore throat, or COVID within last 4 weeks     Talk to your surgeon for instructions if you should stop your aspirin, blood thinner, diabetes medicines, weight loss medications, multivitamins or over the counter supplements since many surgeons have you adjust or stop these medications prior to procedure. The doctor’s office may have you contact the prescribing doctor for medication adjustments for your surgery.     If you take certain medications like Beta Blocker or Anti-Seizure medication, you may have  to take them the morning of procedure with a sip of water. If this is the case your surgeons office should let you know, and the PAT nurses will follow up when they speak to you to verify you are aware.     If not staying overnight after your surgery, and you are receiving any type of anesthesia with your surgery, you must have an adult (age 18 or older) immediately available to drive you home after surgery or your procedure will be cancelled. You may be discharged home after surgery per an Uber, Lyft, Taxi or any other transportation service as long as the responsible adult (18 or older) is in the vehicle with you at time of discharge. The  of these transportation services is not responsible for your care and cannot be consider a responsible adult. We also highly recommend you have someone stay with you for the entire day and night of your surgery.     All jewelry and piercings must be removed. If you are unable to remove an item or have a dermal piercing, please be sure to tell the nurse when you arrive for surgery.     Nail polish must be removed off one finger of each hand     Make-up or other beauty products (lotion, deodorant, hairspray, perfume, etc.) must be removed or not used for day of surgery.     Avoid smoking, consuming alcohol, or any medical or recreational drug use for 24 hours before surgery.     Do not wear contacts to hospital, bring your glasses and a case     Leave valuables at home except photo ID, insurance card and any co-payment that has been requested by hospital.     For adults who are unable to consent or make medical decisions for themselves, a legal guardian or Power of  must accompany them to the surgery center. If this is not possible, please call [phone] to make additional arrangements.  If there is any guardianship or legal Power of  paperwork, please bring them the day of surgery.     Wear comfortable, loose fitting clothing into the procedure.  While  your admitted you are asked to bring short sleeve shirts, shorts (loose like pajamas, sweat shorts), and tennis shoes/sneakers.  No slip on shoes.     Please bring your 2-Wheeled Walker with you the day of surgery and the [facility] Booklet that was given to you during your PAT appointment.     The nurse practitioners, , , physical therapist, and occupational therapist will all discuss and work with you during your stay to help with discharge, physical therapy and any other needs. They also may discuss a program called Meds to Beds, where postoperative medications prescribed to you after surgery will be filled and delivered to your beside before you leave, so that you do not need to stop at the pharmacy on the way home    If you use a CPAP or BIPAP, please make sure the PAT nurse was able to get the settings from you, if not please inform the nurses the day of surgery of your settings you use at home.     Additional instructions about eating and drinking before surgery:     Do not eat any solid foods?or drink anything after midnight the night prior to your procedure. Milk, nutritional drinks/supplements, and infant formula are considered solid foods.  This also includes no gum, candy, lozenges, ice, or any other oral consumption.     CHG SOAP & ORAL RINSE   In regards to bathing, please follow the instructions explained to you at the PAT appointment about taking a showering with the CHG soap the 5 nights prior to your surgery.       During your PAT Appointment you were given [employer] CHG Wash Soap (bottles of bubble gum pink soap) to use before procedure.  Begin using the CHG soap 5 days before your scheduled surgery.  Be sure to sleep on clean sheets - change your sheets the first night you do this cleansing process (you don't not need to change them every night).     CHG SOAP INSTRUCTIONS:  Begin using the CHG soap 5 days prior to your scheduled surgery.  You will wash and  rinse your face and genitals with normal soap.  The night before surgery is the ONLY TIME you use the CHG SOAP for your hair, and do NOT use conditioner after washing with the CHG Soap.  For the rest of the showers the 5 days prior to surgery you will use normal shampoo.  Hair extensions should be removed.  Then apply CHG soap solution to a clean wet washcloth.     Turn the water off or move away from the water spray to avoid premature rinsing of the CHG soap as you apply it.   Avoid getting the CHG soap in your ears, eyes, and mouth.  Apply the CHG soap to entire body from the neck down EXCEPT your face and genitals.  Allow the CHG soap to sit for 3 minutes on your skin.  Then turn on water and rinse the CHG solution off your body completely.    DO NOT wash with regular soap after you have used the CHG soap   Pat yourself dry with a clean, fresh-laundered towel when you get out of the shower and clean Pj's  DO NOT apply any powders, deodorants, or lotions after shower   Be aware the CHG soap will stain fabrics if you wash them with bleach after use.   Make sure to pay special attention to cleaning area(s) where your incision(s) will be located. Avoid scrubbing your skin to hard.  You will repeat this same process steps 1-12 for each shower you take prior to surgery.  The morning of  the surgery is the fifth day.      ORAL RINSE   You will receive a call or notification from your pharmacy to  a prescription prior to procedure.  Be sure to  the prescription oral rinse from your local pharmacy.   You will be using the oral rinse the night before and the morning of surgery.    Take a cap full of the solution, 15mL   Swish (gargle if you can) the mouthwash in your mouth for at least 30 seconds, (DO NOT SWALLOW), and spit out   After the oral CHG rinse, do not rinse your mouth with water, drink, or eat.      If you have to take a medication the morning of surgery as instructed by your surgeon- please take  that medication with a sip of water prior to doing the oral rinse the day of surgery.       ?If you have any questions or concerns, please call Pre-Admission Testing at [phone] or your Physician’s office Dr. Edin Amato  [phone]  [facility] General Line: [phone]

## 2025-04-29 ENCOUNTER — OFFICE VISIT (OUTPATIENT)
Dept: FAMILY MEDICINE CLINIC | Age: 65
End: 2025-04-29
Payer: COMMERCIAL

## 2025-04-29 VITALS
WEIGHT: 185 LBS | HEIGHT: 63 IN | OXYGEN SATURATION: 98 % | DIASTOLIC BLOOD PRESSURE: 74 MMHG | BODY MASS INDEX: 32.78 KG/M2 | SYSTOLIC BLOOD PRESSURE: 130 MMHG | HEART RATE: 61 BPM

## 2025-04-29 DIAGNOSIS — M17.11 PRIMARY OSTEOARTHRITIS OF RIGHT KNEE: ICD-10-CM

## 2025-04-29 DIAGNOSIS — Z01.818 PREOP EXAMINATION: Primary | ICD-10-CM

## 2025-04-29 PROBLEM — R73.03 PREDIABETES: Status: RESOLVED | Noted: 2019-10-23 | Resolved: 2025-04-29

## 2025-04-29 LAB
ESTIMATED AVERAGE GLUCOSE: NORMAL
HBA1C MFR BLD: 5.2 %

## 2025-04-29 PROCEDURE — 3075F SYST BP GE 130 - 139MM HG: CPT | Performed by: FAMILY MEDICINE

## 2025-04-29 PROCEDURE — 3078F DIAST BP <80 MM HG: CPT | Performed by: FAMILY MEDICINE

## 2025-04-29 PROCEDURE — 99213 OFFICE O/P EST LOW 20 MIN: CPT | Performed by: FAMILY MEDICINE

## 2025-04-29 RX ORDER — ATORVASTATIN CALCIUM 20 MG/1
TABLET, FILM COATED ORAL
COMMUNITY
Start: 2025-04-08

## 2025-04-29 SDOH — ECONOMIC STABILITY: FOOD INSECURITY: WITHIN THE PAST 12 MONTHS, YOU WORRIED THAT YOUR FOOD WOULD RUN OUT BEFORE YOU GOT MONEY TO BUY MORE.: NEVER TRUE

## 2025-04-29 SDOH — ECONOMIC STABILITY: FOOD INSECURITY: WITHIN THE PAST 12 MONTHS, THE FOOD YOU BOUGHT JUST DIDN'T LAST AND YOU DIDN'T HAVE MONEY TO GET MORE.: NEVER TRUE

## 2025-04-29 ASSESSMENT — PATIENT HEALTH QUESTIONNAIRE - PHQ9
1. LITTLE INTEREST OR PLEASURE IN DOING THINGS: NOT AT ALL
DEPRESSION UNABLE TO ASSESS: PT REFUSES
2. FEELING DOWN, DEPRESSED OR HOPELESS: NOT AT ALL
SUM OF ALL RESPONSES TO PHQ QUESTIONS 1-9: 0
SUM OF ALL RESPONSES TO PHQ QUESTIONS 1-9: 0

## 2025-04-29 NOTE — PROGRESS NOTES
[facility] PRIMARY CARE  [address] [place] [id number]  Dept: [phone]  Dept Fax: [phone]     Chief Complaint:  Chief Complaint   Patient presents with    Pre-op Exam     5/12/2025- Total RT knee replacement, Dr Mamadou Carrillo at [facility]. PAT done 4/28.       Vitals:    04/29/25 0952   BP: 130/74   Pulse: 61   SpO2: 98%   Weight: 83.9 kg (185 lb)   Height: 1.6 m (5' 3\")       HPI:  64 y.o.male who presents for the following:      Preop exam: planning R knee replacement revision with Dr. Carrillo 4/28/25; getting labs with them; no CP or SOB    -----------------------------------------------------------------------------    Assessment/Plan:  64 y.o. male here mainly for the following:  Preop exam  Acceptable risk for surgery        ICD-10-CM    1. Preop examination  Z01.818       2. Primary osteoarthritis of right knee  M17.11           Return if symptoms worsen or fail to improve.    Chucky Ruiz MD      -----------------------------------------------------------------------------      Objective     Physical Exam:  Physical Exam  Vitals reviewed.   Constitutional:       General: He is not in acute distress.     Appearance: He is well-developed.   HENT:      Head: Normocephalic and atraumatic.      Right Ear: Tympanic membrane, ear canal and external ear normal.      Left Ear: Tympanic membrane, ear canal and external ear normal.      Mouth/Throat:      Pharynx: No oropharyngeal exudate.   Neck:      Thyroid: No thyromegaly.   Cardiovascular:      Rate and Rhythm: Normal rate and regular rhythm.      Heart sounds: Normal heart sounds. No murmur heard.  Pulmonary:      Effort: Pulmonary effort is normal. No respiratory distress.      Breath sounds: Normal breath sounds. No wheezing.   Abdominal:      General: There is no distension.      Palpations: Abdomen is soft.      Tenderness: There is no abdominal tenderness. There is no guarding or rebound.   Musculoskeletal:      Cervical back:

## 2025-04-30 LAB — STAPHYLOCOCCUS SPEC CULT: ABNORMAL

## 2025-05-02 DIAGNOSIS — Z01.818 PREOP EXAMINATION: Primary | ICD-10-CM

## 2025-05-02 RX ORDER — CHLORHEXIDINE GLUCONATE ORAL RINSE 1.2 MG/ML
15 SOLUTION DENTAL AS NEEDED
Qty: 120 ML | Refills: 0 | Status: SHIPPED | OUTPATIENT
Start: 2025-05-02 | End: 2025-05-16

## 2025-05-11 ENCOUNTER — ANESTHESIA EVENT (OUTPATIENT)
Dept: OPERATING ROOM | Facility: HOSPITAL | Age: 65
DRG: 488 | End: 2025-05-11
Payer: COMMERCIAL

## 2025-05-11 RX ORDER — ALBUTEROL SULFATE 0.83 MG/ML
2.5 SOLUTION RESPIRATORY (INHALATION) ONCE AS NEEDED
Status: CANCELLED | OUTPATIENT
Start: 2025-05-11

## 2025-05-11 RX ORDER — MEPERIDINE HYDROCHLORIDE 25 MG/ML
12.5 INJECTION INTRAMUSCULAR; INTRAVENOUS; SUBCUTANEOUS EVERY 10 MIN PRN
Status: CANCELLED | OUTPATIENT
Start: 2025-05-11

## 2025-05-11 RX ORDER — SODIUM CHLORIDE, SODIUM LACTATE, POTASSIUM CHLORIDE, CALCIUM CHLORIDE 600; 310; 30; 20 MG/100ML; MG/100ML; MG/100ML; MG/100ML
100 INJECTION, SOLUTION INTRAVENOUS CONTINUOUS
Status: CANCELLED | OUTPATIENT
Start: 2025-05-11 | End: 2025-05-12

## 2025-05-11 RX ORDER — ACETAMINOPHEN 325 MG/1
650 TABLET ORAL EVERY 4 HOURS PRN
Status: CANCELLED | OUTPATIENT
Start: 2025-05-11

## 2025-05-11 RX ORDER — LIDOCAINE HYDROCHLORIDE 10 MG/ML
0.1 INJECTION, SOLUTION EPIDURAL; INFILTRATION; INTRACAUDAL; PERINEURAL ONCE
Status: CANCELLED | OUTPATIENT
Start: 2025-05-11 | End: 2025-05-11

## 2025-05-11 RX ORDER — DIPHENHYDRAMINE HYDROCHLORIDE 50 MG/ML
12.5 INJECTION, SOLUTION INTRAMUSCULAR; INTRAVENOUS ONCE AS NEEDED
Status: CANCELLED | OUTPATIENT
Start: 2025-05-11

## 2025-05-11 RX ORDER — FENTANYL CITRATE 50 UG/ML
25 INJECTION, SOLUTION INTRAMUSCULAR; INTRAVENOUS EVERY 5 MIN PRN
Refills: 0 | Status: CANCELLED | OUTPATIENT
Start: 2025-05-11

## 2025-05-11 RX ORDER — OXYCODONE HYDROCHLORIDE 5 MG/1
5 TABLET ORAL EVERY 4 HOURS PRN
Refills: 0 | Status: CANCELLED | OUTPATIENT
Start: 2025-05-11

## 2025-05-11 RX ORDER — ONDANSETRON HYDROCHLORIDE 2 MG/ML
4 INJECTION, SOLUTION INTRAVENOUS ONCE AS NEEDED
Status: CANCELLED | OUTPATIENT
Start: 2025-05-11

## 2025-05-11 RX ORDER — OXYCODONE HYDROCHLORIDE 5 MG/1
10 TABLET ORAL EVERY 4 HOURS PRN
Refills: 0 | Status: CANCELLED | OUTPATIENT
Start: 2025-05-11

## 2025-05-12 ENCOUNTER — HOSPITAL ENCOUNTER (INPATIENT)
Facility: HOSPITAL | Age: 65
LOS: 1 days | Discharge: HOME | DRG: 488 | End: 2025-05-13
Attending: ORTHOPAEDIC SURGERY | Admitting: ORTHOPAEDIC SURGERY
Payer: COMMERCIAL

## 2025-05-12 ENCOUNTER — ANESTHESIA (OUTPATIENT)
Dept: OPERATING ROOM | Facility: HOSPITAL | Age: 65
DRG: 488 | End: 2025-05-12
Payer: COMMERCIAL

## 2025-05-12 DIAGNOSIS — T84.022A INSTABILITY OF INTERNAL RIGHT KNEE PROSTHESIS, INITIAL ENCOUNTER: ICD-10-CM

## 2025-05-12 DIAGNOSIS — T84.032A MECHANICAL LOOSENING OF INTERNAL RIGHT KNEE PROSTHETIC JOINT, INITIAL ENCOUNTER: Primary | ICD-10-CM

## 2025-05-12 PROBLEM — Z96.659 FAILED TOTAL KNEE ARTHROPLASTY, SEQUELA: Status: ACTIVE | Noted: 2025-05-12

## 2025-05-12 PROBLEM — T84.018S FAILED TOTAL KNEE ARTHROPLASTY, SEQUELA: Status: ACTIVE | Noted: 2025-05-12

## 2025-05-12 PROCEDURE — 7100000002 HC RECOVERY ROOM TIME - EACH INCREMENTAL 1 MINUTE: Performed by: ORTHOPAEDIC SURGERY

## 2025-05-12 PROCEDURE — 3700000001 HC GENERAL ANESTHESIA TIME - INITIAL BASE CHARGE: Performed by: ORTHOPAEDIC SURGERY

## 2025-05-12 PROCEDURE — 2500000001 HC RX 250 WO HCPCS SELF ADMINISTERED DRUGS (ALT 637 FOR MEDICARE OP): Performed by: PHYSICIAN ASSISTANT

## 2025-05-12 PROCEDURE — 87070 CULTURE OTHR SPECIMN AEROBIC: CPT | Mod: ELYLAB | Performed by: PHYSICIAN ASSISTANT

## 2025-05-12 PROCEDURE — 2500000005 HC RX 250 GENERAL PHARMACY W/O HCPCS: Mod: JZ | Performed by: ORTHOPAEDIC SURGERY

## 2025-05-12 PROCEDURE — 3600000005 HC OR TIME - INITIAL BASE CHARGE - PROCEDURE LEVEL FIVE: Performed by: ORTHOPAEDIC SURGERY

## 2025-05-12 PROCEDURE — 88300 SURGICAL PATH GROSS: CPT | Mod: TC,ELYLAB | Performed by: PHYSICIAN ASSISTANT

## 2025-05-12 PROCEDURE — 2500000002 HC RX 250 W HCPCS SELF ADMINISTERED DRUGS (ALT 637 FOR MEDICARE OP, ALT 636 FOR OP/ED): Performed by: PHYSICIAN ASSISTANT

## 2025-05-12 PROCEDURE — 0SUV09Z SUPPLEMENT RIGHT KNEE JOINT, TIBIAL SURFACE WITH LINER, OPEN APPROACH: ICD-10-PCS | Performed by: ORTHOPAEDIC SURGERY

## 2025-05-12 PROCEDURE — 2500000001 HC RX 250 WO HCPCS SELF ADMINISTERED DRUGS (ALT 637 FOR MEDICARE OP): Performed by: ANESTHESIOLOGY

## 2025-05-12 PROCEDURE — 3600000010 HC OR TIME - EACH INCREMENTAL 1 MINUTE - PROCEDURE LEVEL FIVE: Performed by: ORTHOPAEDIC SURGERY

## 2025-05-12 PROCEDURE — 97110 THERAPEUTIC EXERCISES: CPT | Mod: GP | Performed by: PHYSICAL THERAPIST

## 2025-05-12 PROCEDURE — 2500000004 HC RX 250 GENERAL PHARMACY W/ HCPCS (ALT 636 FOR OP/ED): Mod: JZ | Performed by: PHYSICIAN ASSISTANT

## 2025-05-12 PROCEDURE — 2500000005 HC RX 250 GENERAL PHARMACY W/O HCPCS: Performed by: PHYSICIAN ASSISTANT

## 2025-05-12 PROCEDURE — 27486 REVISE/REPLACE KNEE JOINT: CPT | Performed by: PHYSICIAN ASSISTANT

## 2025-05-12 PROCEDURE — 2500000001 HC RX 250 WO HCPCS SELF ADMINISTERED DRUGS (ALT 637 FOR MEDICARE OP)

## 2025-05-12 PROCEDURE — 97161 PT EVAL LOW COMPLEX 20 MIN: CPT | Mod: GP | Performed by: PHYSICAL THERAPIST

## 2025-05-12 PROCEDURE — 7100000001 HC RECOVERY ROOM TIME - INITIAL BASE CHARGE: Performed by: ORTHOPAEDIC SURGERY

## 2025-05-12 PROCEDURE — 2500000002 HC RX 250 W HCPCS SELF ADMINISTERED DRUGS (ALT 637 FOR MEDICARE OP, ALT 636 FOR OP/ED)

## 2025-05-12 PROCEDURE — 2500000004 HC RX 250 GENERAL PHARMACY W/ HCPCS (ALT 636 FOR OP/ED): Mod: JZ | Performed by: ANESTHESIOLOGY

## 2025-05-12 PROCEDURE — 0SPC09Z REMOVAL OF LINER FROM RIGHT KNEE JOINT, OPEN APPROACH: ICD-10-PCS | Performed by: ORTHOPAEDIC SURGERY

## 2025-05-12 PROCEDURE — 27486 REVISE/REPLACE KNEE JOINT: CPT | Performed by: ORTHOPAEDIC SURGERY

## 2025-05-12 PROCEDURE — 3700000002 HC GENERAL ANESTHESIA TIME - EACH INCREMENTAL 1 MINUTE: Performed by: ORTHOPAEDIC SURGERY

## 2025-05-12 PROCEDURE — 1100000001 HC PRIVATE ROOM DAILY

## 2025-05-12 PROCEDURE — 2720000007 HC OR 272 NO HCPCS: Performed by: ORTHOPAEDIC SURGERY

## 2025-05-12 PROCEDURE — 2500000002 HC RX 250 W HCPCS SELF ADMINISTERED DRUGS (ALT 637 FOR MEDICARE OP, ALT 636 FOR OP/ED): Performed by: ORTHOPAEDIC SURGERY

## 2025-05-12 PROCEDURE — 2500000004 HC RX 250 GENERAL PHARMACY W/ HCPCS (ALT 636 FOR OP/ED): Performed by: ORTHOPAEDIC SURGERY

## 2025-05-12 PROCEDURE — 2500000001 HC RX 250 WO HCPCS SELF ADMINISTERED DRUGS (ALT 637 FOR MEDICARE OP): Performed by: ORTHOPAEDIC SURGERY

## 2025-05-12 PROCEDURE — 97116 GAIT TRAINING THERAPY: CPT | Mod: GP | Performed by: PHYSICAL THERAPIST

## 2025-05-12 DEVICE — IMPLANTABLE DEVICE
Type: IMPLANTABLE DEVICE | Site: KNEE | Status: FUNCTIONAL
Brand: PERSONA® VIVACIT-E®

## 2025-05-12 RX ORDER — ASPIRIN 81 MG/1
81 TABLET ORAL 2 TIMES DAILY
Status: DISCONTINUED | OUTPATIENT
Start: 2025-05-12 | End: 2025-05-13 | Stop reason: HOSPADM

## 2025-05-12 RX ORDER — BUPIVACAINE HYDROCHLORIDE 7.5 MG/ML
INJECTION INTRAVENOUS AS NEEDED
Status: DISCONTINUED | OUTPATIENT
Start: 2025-05-12 | End: 2025-05-12

## 2025-05-12 RX ORDER — OXYCODONE HYDROCHLORIDE 5 MG/1
10 TABLET ORAL EVERY 4 HOURS PRN
Status: DISCONTINUED | OUTPATIENT
Start: 2025-05-12 | End: 2025-05-13 | Stop reason: HOSPADM

## 2025-05-12 RX ORDER — CEFAZOLIN SODIUM 2 G/50ML
2 SOLUTION INTRAVENOUS ONCE
Status: COMPLETED | OUTPATIENT
Start: 2025-05-12 | End: 2025-05-12

## 2025-05-12 RX ORDER — PROCHLORPERAZINE MALEATE 5 MG
10 TABLET ORAL EVERY 6 HOURS PRN
Status: DISCONTINUED | OUTPATIENT
Start: 2025-05-12 | End: 2025-05-13 | Stop reason: HOSPADM

## 2025-05-12 RX ORDER — TRANEXAMIC ACID 650 MG/1
1950 TABLET ORAL ONCE
Status: COMPLETED | OUTPATIENT
Start: 2025-05-12 | End: 2025-05-12

## 2025-05-12 RX ORDER — OXYCODONE HYDROCHLORIDE 5 MG/1
10 TABLET ORAL EVERY 6 HOURS PRN
Status: DISCONTINUED | OUTPATIENT
Start: 2025-05-12 | End: 2025-05-13 | Stop reason: HOSPADM

## 2025-05-12 RX ORDER — NALOXONE HYDROCHLORIDE 1 MG/ML
0.2 INJECTION INTRAMUSCULAR; INTRAVENOUS; SUBCUTANEOUS EVERY 5 MIN PRN
Status: DISCONTINUED | OUTPATIENT
Start: 2025-05-12 | End: 2025-05-13 | Stop reason: HOSPADM

## 2025-05-12 RX ORDER — ATORVASTATIN CALCIUM 20 MG/1
20 TABLET, FILM COATED ORAL NIGHTLY
Status: DISCONTINUED | OUTPATIENT
Start: 2025-05-12 | End: 2025-05-13 | Stop reason: HOSPADM

## 2025-05-12 RX ORDER — ACETAMINOPHEN 325 MG/1
650 TABLET ORAL EVERY 6 HOURS SCHEDULED
Status: DISCONTINUED | OUTPATIENT
Start: 2025-05-12 | End: 2025-05-13 | Stop reason: HOSPADM

## 2025-05-12 RX ORDER — ONDANSETRON 4 MG/1
4 TABLET, ORALLY DISINTEGRATING ORAL EVERY 8 HOURS PRN
Status: DISCONTINUED | OUTPATIENT
Start: 2025-05-12 | End: 2025-05-13 | Stop reason: HOSPADM

## 2025-05-12 RX ORDER — BISACODYL 5 MG
10 TABLET, DELAYED RELEASE (ENTERIC COATED) ORAL DAILY PRN
Status: DISCONTINUED | OUTPATIENT
Start: 2025-05-12 | End: 2025-05-13 | Stop reason: HOSPADM

## 2025-05-12 RX ORDER — OXYCODONE HYDROCHLORIDE 5 MG/1
5 TABLET ORAL EVERY 4 HOURS PRN
Status: DISCONTINUED | OUTPATIENT
Start: 2025-05-12 | End: 2025-05-13 | Stop reason: HOSPADM

## 2025-05-12 RX ORDER — PROCHLORPERAZINE 25 MG/1
25 SUPPOSITORY RECTAL EVERY 12 HOURS PRN
Status: DISCONTINUED | OUTPATIENT
Start: 2025-05-12 | End: 2025-05-13 | Stop reason: HOSPADM

## 2025-05-12 RX ORDER — ACETAMINOPHEN 325 MG/1
650 TABLET ORAL ONCE
Status: COMPLETED | OUTPATIENT
Start: 2025-05-12 | End: 2025-05-12

## 2025-05-12 RX ORDER — ROPIVACAINE HYDROCHLORIDE 5 MG/ML
20 INJECTION, SOLUTION EPIDURAL; INFILTRATION; PERINEURAL ONCE
Status: COMPLETED | OUTPATIENT
Start: 2025-05-12 | End: 2025-05-12

## 2025-05-12 RX ORDER — SODIUM CHLORIDE, SODIUM LACTATE, POTASSIUM CHLORIDE, CALCIUM CHLORIDE 600; 310; 30; 20 MG/100ML; MG/100ML; MG/100ML; MG/100ML
50 INJECTION, SOLUTION INTRAVENOUS CONTINUOUS
Status: ACTIVE | OUTPATIENT
Start: 2025-05-12 | End: 2025-05-13

## 2025-05-12 RX ORDER — PROCHLORPERAZINE EDISYLATE 5 MG/ML
10 INJECTION INTRAMUSCULAR; INTRAVENOUS EVERY 6 HOURS PRN
Status: DISCONTINUED | OUTPATIENT
Start: 2025-05-12 | End: 2025-05-13 | Stop reason: HOSPADM

## 2025-05-12 RX ORDER — LIDOCAINE HYDROCHLORIDE 20 MG/ML
INJECTION, SOLUTION EPIDURAL; INFILTRATION; INTRACAUDAL; PERINEURAL AS NEEDED
Status: DISCONTINUED | OUTPATIENT
Start: 2025-05-12 | End: 2025-05-12

## 2025-05-12 RX ORDER — FENTANYL CITRATE 50 UG/ML
50 INJECTION, SOLUTION INTRAMUSCULAR; INTRAVENOUS EVERY 5 MIN PRN
Status: DISCONTINUED | OUTPATIENT
Start: 2025-05-12 | End: 2025-05-12 | Stop reason: HOSPADM

## 2025-05-12 RX ORDER — TAMSULOSIN HYDROCHLORIDE 0.4 MG/1
0.4 CAPSULE ORAL NIGHTLY
Status: DISCONTINUED | OUTPATIENT
Start: 2025-05-12 | End: 2025-05-13 | Stop reason: HOSPADM

## 2025-05-12 RX ORDER — DOCUSATE SODIUM 100 MG/1
100 CAPSULE, LIQUID FILLED ORAL 2 TIMES DAILY
Status: DISCONTINUED | OUTPATIENT
Start: 2025-05-12 | End: 2025-05-13 | Stop reason: HOSPADM

## 2025-05-12 RX ORDER — ONDANSETRON HYDROCHLORIDE 2 MG/ML
4 INJECTION, SOLUTION INTRAVENOUS EVERY 8 HOURS PRN
Status: DISCONTINUED | OUTPATIENT
Start: 2025-05-12 | End: 2025-05-13 | Stop reason: HOSPADM

## 2025-05-12 RX ORDER — SODIUM CHLORIDE, SODIUM LACTATE, POTASSIUM CHLORIDE, CALCIUM CHLORIDE 600; 310; 30; 20 MG/100ML; MG/100ML; MG/100ML; MG/100ML
100 INJECTION, SOLUTION INTRAVENOUS CONTINUOUS
Status: DISCONTINUED | OUTPATIENT
Start: 2025-05-12 | End: 2025-05-12 | Stop reason: HOSPADM

## 2025-05-12 RX ORDER — KETOROLAC TROMETHAMINE 30 MG/ML
15 INJECTION, SOLUTION INTRAMUSCULAR; INTRAVENOUS EVERY 6 HOURS
Status: COMPLETED | OUTPATIENT
Start: 2025-05-12 | End: 2025-05-13

## 2025-05-12 RX ORDER — ROPIVACAINE/EPI/CLONIDINE/KET 2.46-0.005
SYRINGE (ML) INJECTION AS NEEDED
Status: DISCONTINUED | OUTPATIENT
Start: 2025-05-12 | End: 2025-05-12 | Stop reason: HOSPADM

## 2025-05-12 RX ORDER — SODIUM CHLORIDE, SODIUM LACTATE, POTASSIUM CHLORIDE, CALCIUM CHLORIDE 600; 310; 30; 20 MG/100ML; MG/100ML; MG/100ML; MG/100ML
50 INJECTION, SOLUTION INTRAVENOUS CONTINUOUS
Status: DISCONTINUED | OUTPATIENT
Start: 2025-05-12 | End: 2025-05-13 | Stop reason: HOSPADM

## 2025-05-12 RX ORDER — MEPERIDINE HYDROCHLORIDE 25 MG/ML
12.5 INJECTION INTRAMUSCULAR; INTRAVENOUS; SUBCUTANEOUS EVERY 10 MIN PRN
Status: DISCONTINUED | OUTPATIENT
Start: 2025-05-12 | End: 2025-05-12 | Stop reason: HOSPADM

## 2025-05-12 RX ORDER — CYCLOBENZAPRINE HCL 10 MG
10 TABLET ORAL 3 TIMES DAILY PRN
Status: DISCONTINUED | OUTPATIENT
Start: 2025-05-12 | End: 2025-05-13 | Stop reason: HOSPADM

## 2025-05-12 RX ORDER — PHENYLEPHRINE HCL IN 0.9% NACL 1 MG/10 ML
SYRINGE (ML) INTRAVENOUS AS NEEDED
Status: DISCONTINUED | OUTPATIENT
Start: 2025-05-12 | End: 2025-05-12

## 2025-05-12 RX ORDER — ONDANSETRON HYDROCHLORIDE 2 MG/ML
4 INJECTION, SOLUTION INTRAVENOUS ONCE AS NEEDED
Status: DISCONTINUED | OUTPATIENT
Start: 2025-05-12 | End: 2025-05-12 | Stop reason: HOSPADM

## 2025-05-12 RX ORDER — PROPOFOL 10 MG/ML
INJECTION, EMULSION INTRAVENOUS AS NEEDED
Status: DISCONTINUED | OUTPATIENT
Start: 2025-05-12 | End: 2025-05-12

## 2025-05-12 RX ORDER — CELECOXIB 200 MG/1
200 CAPSULE ORAL ONCE
Status: COMPLETED | OUTPATIENT
Start: 2025-05-12 | End: 2025-05-12

## 2025-05-12 RX ORDER — FENTANYL CITRATE 50 UG/ML
25 INJECTION, SOLUTION INTRAMUSCULAR; INTRAVENOUS EVERY 5 MIN PRN
Status: DISCONTINUED | OUTPATIENT
Start: 2025-05-12 | End: 2025-05-12 | Stop reason: HOSPADM

## 2025-05-12 RX ORDER — SODIUM CHLORIDE 0.9 G/100ML
INJECTION, SOLUTION IRRIGATION AS NEEDED
Status: DISCONTINUED | OUTPATIENT
Start: 2025-05-12 | End: 2025-05-12 | Stop reason: HOSPADM

## 2025-05-12 RX ORDER — TRANEXAMIC ACID 650 MG/1
1950 TABLET ORAL ONCE
Status: COMPLETED | OUTPATIENT
Start: 2025-05-13 | End: 2025-05-13

## 2025-05-12 RX ORDER — CEFAZOLIN SODIUM 2 G/50ML
2 SOLUTION INTRAVENOUS EVERY 8 HOURS
Status: COMPLETED | OUTPATIENT
Start: 2025-05-12 | End: 2025-05-13

## 2025-05-12 RX ORDER — MIDAZOLAM HYDROCHLORIDE 1 MG/ML
INJECTION, SOLUTION INTRAMUSCULAR; INTRAVENOUS AS NEEDED
Status: DISCONTINUED | OUTPATIENT
Start: 2025-05-12 | End: 2025-05-12

## 2025-05-12 RX ADMIN — CEFAZOLIN SODIUM 2 G: 2 SOLUTION INTRAVENOUS at 09:41

## 2025-05-12 RX ADMIN — ATORVASTATIN CALCIUM 20 MG: 20 TABLET, FILM COATED ORAL at 20:28

## 2025-05-12 RX ADMIN — ACETAMINOPHEN 650 MG: 325 TABLET ORAL at 17:05

## 2025-05-12 RX ADMIN — CEFAZOLIN SODIUM 2 G: 2 SOLUTION INTRAVENOUS at 17:37

## 2025-05-12 RX ADMIN — KETOROLAC TROMETHAMINE 15 MG: 30 INJECTION, SOLUTION INTRAMUSCULAR at 13:51

## 2025-05-12 RX ADMIN — FENTANYL CITRATE 25 MCG: 50 INJECTION INTRAMUSCULAR; INTRAVENOUS at 11:33

## 2025-05-12 RX ADMIN — DOCUSATE SODIUM 100 MG: 100 CAPSULE, LIQUID FILLED ORAL at 20:28

## 2025-05-12 RX ADMIN — CELECOXIB 200 MG: 200 CAPSULE ORAL at 07:07

## 2025-05-12 RX ADMIN — Medication 100 MCG: at 10:44

## 2025-05-12 RX ADMIN — LIDOCAINE HYDROCHLORIDE 50 MG: 20 INJECTION, SOLUTION EPIDURAL; INFILTRATION; INTRACAUDAL; PERINEURAL at 09:38

## 2025-05-12 RX ADMIN — SODIUM CHLORIDE, SODIUM LACTATE, POTASSIUM CHLORIDE, AND CALCIUM CHLORIDE 50 ML/HR: .6; .31; .03; .02 INJECTION, SOLUTION INTRAVENOUS at 13:50

## 2025-05-12 RX ADMIN — KETOROLAC TROMETHAMINE 15 MG: 30 INJECTION, SOLUTION INTRAMUSCULAR at 20:30

## 2025-05-12 RX ADMIN — ROPIVACAINE HYDROCHLORIDE 100 MG: 5 INJECTION, SOLUTION EPIDURAL; INFILTRATION; PERINEURAL at 08:34

## 2025-05-12 RX ADMIN — ASPIRIN 81 MG: 81 TABLET, COATED ORAL at 20:28

## 2025-05-12 RX ADMIN — PROPOFOL 70 MG: 10 INJECTION, EMULSION INTRAVENOUS at 09:38

## 2025-05-12 RX ADMIN — TAMSULOSIN HYDROCHLORIDE 0.4 MG: 0.4 CAPSULE ORAL at 20:28

## 2025-05-12 RX ADMIN — MIDAZOLAM HYDROCHLORIDE 5 MG: 1 INJECTION, SOLUTION INTRAMUSCULAR; INTRAVENOUS at 08:20

## 2025-05-12 RX ADMIN — BUPIVACAINE HYDROCHLORIDE IN DEXTROSE 1.1 ML: 7.5 INJECTION, SOLUTION SUBARACHNOID at 09:35

## 2025-05-12 RX ADMIN — TRANEXAMIC ACID 1950 MG: 650 TABLET ORAL at 07:07

## 2025-05-12 RX ADMIN — ACETAMINOPHEN 650 MG: 325 TABLET ORAL at 13:51

## 2025-05-12 RX ADMIN — SODIUM CHLORIDE, SODIUM LACTATE, POTASSIUM CHLORIDE, AND CALCIUM CHLORIDE 50 ML/HR: .6; .31; .03; .02 INJECTION, SOLUTION INTRAVENOUS at 07:15

## 2025-05-12 RX ADMIN — DEXAMETHASONE SODIUM PHOSPHATE: 10 INJECTION, SOLUTION INTRAMUSCULAR; INTRAVENOUS at 08:34

## 2025-05-12 RX ADMIN — FENTANYL CITRATE 25 MCG: 50 INJECTION INTRAMUSCULAR; INTRAVENOUS at 11:27

## 2025-05-12 RX ADMIN — TRANEXAMIC ACID 1950 MG: 650 TABLET ORAL at 17:05

## 2025-05-12 RX ADMIN — POVIDONE-IODINE 2 APPLICATION: 5 SOLUTION TOPICAL at 07:10

## 2025-05-12 RX ADMIN — ACETAMINOPHEN 650 MG: 325 TABLET ORAL at 07:07

## 2025-05-12 RX ADMIN — OXYCODONE 5 MG: 5 TABLET ORAL at 12:23

## 2025-05-12 RX ADMIN — DOCUSATE SODIUM 100 MG: 100 CAPSULE, LIQUID FILLED ORAL at 13:51

## 2025-05-12 RX ADMIN — PROPOFOL 75 MCG/KG/MIN: 10 INJECTION, EMULSION INTRAVENOUS at 09:39

## 2025-05-12 SDOH — HEALTH STABILITY: MENTAL HEALTH: HOW MANY DRINKS CONTAINING ALCOHOL DO YOU HAVE ON A TYPICAL DAY WHEN YOU ARE DRINKING?: PATIENT DOES NOT DRINK

## 2025-05-12 SDOH — ECONOMIC STABILITY: INCOME INSECURITY
IN THE PAST 12 MONTHS HAS THE ELECTRIC, GAS, OIL, OR WATER COMPANY THREATENED TO SHUT OFF SERVICES IN YOUR HOME?: PATIENT DECLINED

## 2025-05-12 SDOH — ECONOMIC STABILITY: FOOD INSECURITY
WITHIN THE PAST 12 MONTHS, YOU WORRIED THAT YOUR FOOD WOULD RUN OUT BEFORE YOU GOT THE MONEY TO BUY MORE.: PATIENT DECLINED

## 2025-05-12 SDOH — SOCIAL STABILITY: SOCIAL INSECURITY
WITHIN THE LAST YEAR, HAVE YOU BEEN KICKED, HIT, SLAPPED, OR OTHERWISE PHYSICALLY HURT BY YOUR PARTNER OR EX-PARTNER?: PATIENT DECLINED

## 2025-05-12 SDOH — ECONOMIC STABILITY: HOUSING INSECURITY: AT ANY TIME IN THE PAST 12 MONTHS, WERE YOU HOMELESS OR LIVING IN A SHELTER (INCLUDING NOW)?: PATIENT DECLINED

## 2025-05-12 SDOH — HEALTH STABILITY: MENTAL HEALTH: HOW OFTEN DO YOU HAVE SIX OR MORE DRINKS ON ONE OCCASION?: NEVER

## 2025-05-12 SDOH — ECONOMIC STABILITY: FOOD INSECURITY: HOW HARD IS IT FOR YOU TO PAY FOR THE VERY BASICS LIKE FOOD, HOUSING, MEDICAL CARE, AND HEATING?: PATIENT DECLINED

## 2025-05-12 SDOH — HEALTH STABILITY: MENTAL HEALTH: HOW OFTEN DO YOU HAVE A DRINK CONTAINING ALCOHOL?: NEVER

## 2025-05-12 SDOH — ECONOMIC STABILITY: HOUSING INSECURITY: IN THE LAST 12 MONTHS, WAS THERE A TIME WHEN YOU WERE NOT ABLE TO PAY THE MORTGAGE OR RENT ON TIME?: PATIENT DECLINED

## 2025-05-12 SDOH — SOCIAL STABILITY: SOCIAL INSECURITY: DO YOU FEEL ANYONE HAS EXPLOITED OR TAKEN ADVANTAGE OF YOU FINANCIALLY OR OF YOUR PERSONAL PROPERTY?: NO

## 2025-05-12 SDOH — SOCIAL STABILITY: SOCIAL INSECURITY
WITHIN THE LAST YEAR, HAVE YOU BEEN HUMILIATED OR EMOTIONALLY ABUSED IN OTHER WAYS BY YOUR PARTNER OR EX-PARTNER?: PATIENT DECLINED

## 2025-05-12 SDOH — SOCIAL STABILITY: SOCIAL INSECURITY
WITHIN THE LAST YEAR, HAVE YOU BEEN RAPED OR FORCED TO HAVE ANY KIND OF SEXUAL ACTIVITY BY YOUR PARTNER OR EX-PARTNER?: PATIENT DECLINED

## 2025-05-12 SDOH — ECONOMIC STABILITY: HOUSING INSECURITY: IN THE PAST 12 MONTHS, HOW MANY TIMES HAVE YOU MOVED WHERE YOU WERE LIVING?: 0

## 2025-05-12 SDOH — SOCIAL STABILITY: SOCIAL INSECURITY: ARE YOU OR HAVE YOU BEEN THREATENED OR ABUSED PHYSICALLY, EMOTIONALLY, OR SEXUALLY BY ANYONE?: NO

## 2025-05-12 SDOH — SOCIAL STABILITY: SOCIAL INSECURITY: WITHIN THE LAST YEAR, HAVE YOU BEEN AFRAID OF YOUR PARTNER OR EX-PARTNER?: PATIENT DECLINED

## 2025-05-12 SDOH — SOCIAL STABILITY: SOCIAL INSECURITY: ABUSE: ADULT

## 2025-05-12 SDOH — SOCIAL STABILITY: SOCIAL INSECURITY: HAS ANYONE EVER THREATENED TO HURT YOUR FAMILY OR YOUR PETS?: NO

## 2025-05-12 SDOH — ECONOMIC STABILITY: FOOD INSECURITY: WITHIN THE PAST 12 MONTHS, THE FOOD YOU BOUGHT JUST DIDN'T LAST AND YOU DIDN'T HAVE MONEY TO GET MORE.: PATIENT DECLINED

## 2025-05-12 SDOH — SOCIAL STABILITY: SOCIAL INSECURITY: HAVE YOU HAD ANY THOUGHTS OF HARMING ANYONE ELSE?: NO

## 2025-05-12 SDOH — SOCIAL STABILITY: SOCIAL INSECURITY: WERE YOU ABLE TO COMPLETE ALL THE BEHAVIORAL HEALTH SCREENINGS?: YES

## 2025-05-12 SDOH — SOCIAL STABILITY: SOCIAL INSECURITY: DOES ANYONE TRY TO KEEP YOU FROM HAVING/CONTACTING OTHER FRIENDS OR DOING THINGS OUTSIDE YOUR HOME?: NO

## 2025-05-12 SDOH — SOCIAL STABILITY: SOCIAL INSECURITY: HAVE YOU HAD THOUGHTS OF HARMING ANYONE ELSE?: NO

## 2025-05-12 SDOH — ECONOMIC STABILITY: TRANSPORTATION INSECURITY
IN THE PAST 12 MONTHS, HAS LACK OF TRANSPORTATION KEPT YOU FROM MEDICAL APPOINTMENTS OR FROM GETTING MEDICATIONS?: PATIENT DECLINED

## 2025-05-12 SDOH — SOCIAL STABILITY: SOCIAL INSECURITY: DO YOU FEEL UNSAFE GOING BACK TO THE PLACE WHERE YOU ARE LIVING?: NO

## 2025-05-12 SDOH — SOCIAL STABILITY: SOCIAL INSECURITY: ARE THERE ANY APPARENT SIGNS OF INJURIES/BEHAVIORS THAT COULD BE RELATED TO ABUSE/NEGLECT?: NO

## 2025-05-12 ASSESSMENT — PAIN SCALES - GENERAL
PAIN_LEVEL: 2
PAINLEVEL_OUTOF10: 3
PAINLEVEL_OUTOF10: 3
PAINLEVEL_OUTOF10: 4
PAINLEVEL_OUTOF10: 1
PAINLEVEL_OUTOF10: 3
PAINLEVEL_OUTOF10: 3
PAINLEVEL_OUTOF10: 0 - NO PAIN
PAINLEVEL_OUTOF10: 1
PAINLEVEL_OUTOF10: 4
PAINLEVEL_OUTOF10: 0 - NO PAIN
PAINLEVEL_OUTOF10: 1
PAINLEVEL_OUTOF10: 3
PAINLEVEL_OUTOF10: 1

## 2025-05-12 ASSESSMENT — COGNITIVE AND FUNCTIONAL STATUS - GENERAL
MOVING FROM LYING ON BACK TO SITTING ON SIDE OF FLAT BED WITH BEDRAILS: A LITTLE
HELP NEEDED FOR BATHING: A LITTLE
TURNING FROM BACK TO SIDE WHILE IN FLAT BAD: A LITTLE
MOBILITY SCORE: 15
WALKING IN HOSPITAL ROOM: A LOT
TOILETING: A LITTLE
MOVING FROM LYING ON BACK TO SITTING ON SIDE OF FLAT BED WITH BEDRAILS: A LITTLE
TURNING FROM BACK TO SIDE WHILE IN FLAT BAD: A LITTLE
MOVING TO AND FROM BED TO CHAIR: A LOT
MOBILITY SCORE: 15
DRESSING REGULAR UPPER BODY CLOTHING: A LITTLE
DRESSING REGULAR UPPER BODY CLOTHING: A LITTLE
WALKING IN HOSPITAL ROOM: A LOT
TURNING FROM BACK TO SIDE WHILE IN FLAT BAD: A LITTLE
MOBILITY SCORE: 16
STANDING UP FROM CHAIR USING ARMS: A LITTLE
CLIMB 3 TO 5 STEPS WITH RAILING: TOTAL
PATIENT BASELINE BEDBOUND: NO
MOVING TO AND FROM BED TO CHAIR: A LOT
CLIMB 3 TO 5 STEPS WITH RAILING: A LOT
WALKING IN HOSPITAL ROOM: A LITTLE
MOVING FROM LYING ON BACK TO SITTING ON SIDE OF FLAT BED WITH BEDRAILS: A LITTLE
DAILY ACTIVITIY SCORE: 19
HELP NEEDED FOR BATHING: A LITTLE
STANDING UP FROM CHAIR USING ARMS: A LITTLE
TOILETING: A LITTLE
STANDING UP FROM CHAIR USING ARMS: A LITTLE
CLIMB 3 TO 5 STEPS WITH RAILING: A LOT
DRESSING REGULAR LOWER BODY CLOTHING: A LOT
DAILY ACTIVITIY SCORE: 19
MOVING TO AND FROM BED TO CHAIR: A LITTLE
DRESSING REGULAR LOWER BODY CLOTHING: A LOT

## 2025-05-12 ASSESSMENT — LIFESTYLE VARIABLES
AUDIT-C TOTAL SCORE: 0
HOW OFTEN DO YOU HAVE A DRINK CONTAINING ALCOHOL: NEVER
AUDIT-C TOTAL SCORE: 0
SKIP TO QUESTIONS 9-10: 1
HOW MANY STANDARD DRINKS CONTAINING ALCOHOL DO YOU HAVE ON A TYPICAL DAY: PATIENT DOES NOT DRINK
HOW OFTEN DO YOU HAVE 6 OR MORE DRINKS ON ONE OCCASION: NEVER
SKIP TO QUESTIONS 9-10: 1
AUDIT-C TOTAL SCORE: 0

## 2025-05-12 ASSESSMENT — ACTIVITIES OF DAILY LIVING (ADL)
TOILETING: INDEPENDENT
DRESSING YOURSELF: INDEPENDENT
LACK_OF_TRANSPORTATION: PATIENT DECLINED
ASSISTIVE_DEVICE: WALKER
WALKS IN HOME: INDEPENDENT
LACK_OF_TRANSPORTATION: PATIENT DECLINED
ADEQUATE_TO_COMPLETE_ADL: YES
GROOMING: INDEPENDENT
BATHING: INDEPENDENT
FEEDING YOURSELF: INDEPENDENT
HEARING - RIGHT EAR: FUNCTIONAL
HEARING - LEFT EAR: FUNCTIONAL
JUDGMENT_ADEQUATE_SAFELY_COMPLETE_DAILY_ACTIVITIES: YES
PATIENT'S MEMORY ADEQUATE TO SAFELY COMPLETE DAILY ACTIVITIES?: YES

## 2025-05-12 ASSESSMENT — PAIN DESCRIPTION - DESCRIPTORS
DESCRIPTORS: SORE
DESCRIPTORS: ACHING
DESCRIPTORS: SORE

## 2025-05-12 ASSESSMENT — COLUMBIA-SUICIDE SEVERITY RATING SCALE - C-SSRS
1. IN THE PAST MONTH, HAVE YOU WISHED YOU WERE DEAD OR WISHED YOU COULD GO TO SLEEP AND NOT WAKE UP?: NO
6. HAVE YOU EVER DONE ANYTHING, STARTED TO DO ANYTHING, OR PREPARED TO DO ANYTHING TO END YOUR LIFE?: NO
2. HAVE YOU ACTUALLY HAD ANY THOUGHTS OF KILLING YOURSELF?: NO

## 2025-05-12 ASSESSMENT — PAIN DESCRIPTION - ORIENTATION
ORIENTATION: RIGHT

## 2025-05-12 ASSESSMENT — PATIENT HEALTH QUESTIONNAIRE - PHQ9
SUM OF ALL RESPONSES TO PHQ9 QUESTIONS 1 & 2: 0
1. LITTLE INTEREST OR PLEASURE IN DOING THINGS: NOT AT ALL
2. FEELING DOWN, DEPRESSED OR HOPELESS: NOT AT ALL

## 2025-05-12 ASSESSMENT — PAIN DESCRIPTION - LOCATION
LOCATION: KNEE

## 2025-05-12 NOTE — ANESTHESIA POSTPROCEDURE EVALUATION
Patient: Gunnar Burton    Procedure Summary       Date: 05/12/25 Room / Location: [facility] 12 / Virtual [facility]    Anesthesia Start: 0927 Anesthesia Stop: 1113    Procedure: RIGHT KNEE POLY SWAP POLYATHEYLENE EXCHANGE VS TIBIEL REVISION AND POLY EXCHANGE (Right: Hip) Diagnosis:       Mechanical loosening of internal right knee prosthetic joint, initial encounter      Instability of internal right knee prosthesis, initial encounter      (Mechanical loosening of internal right knee prosthetic joint, initial encounter (CMS-HCC) [T84.032A])      (Instability of internal right knee prosthesis, initial encounter (CMS-HCC) [T84.022A])    Surgeons: Edin Amato MD Responsible Provider: Markie Morfin MD    Anesthesia Type: spinal, regional ASA Status: 2            Anesthesia Type: spinal, regional    Vitals Value Taken Time   BP 98/58 05/12/25 12:30   Temp 36.1 °C (97 °F) 05/12/25 12:11   Pulse 54 05/12/25 12:30   Resp 16 05/12/25 12:30   SpO2 96 % 05/12/25 12:30       Anesthesia Post Evaluation    Patient location during evaluation: PACU  Patient participation: complete - patient participated  Level of consciousness: awake  Pain score: 2  Pain management: adequate  Multimodal analgesia pain management approach  Airway patency: patent  Cardiovascular status: acceptable and hemodynamically stable  Respiratory status: acceptable and nonlabored ventilation  Hydration status: acceptable  Postoperative Nausea and Vomiting: none        No notable events documented.

## 2025-05-12 NOTE — ANESTHESIA PROCEDURE NOTES
Peripheral Block    Patient location during procedure: pre-op  Medication administered at: 5/12/2025 8:20 AM  End time: 5/12/2025 8:25 AM  Reason for block: at surgeon's request and post-op pain management  Staffing  Performed: attending   Authorized by: Markie Morfin MD    Performed by: Markie Morfin MD  Preanesthetic Checklist  Completed: patient identified, IV checked, site marked, risks and benefits discussed, surgical consent, monitors and equipment checked, pre-op evaluation and timeout performed   Timeout performed at: 5/12/2025 8:20 AM  Peripheral Block  Patient position: laying flat  Prep: ChloraPrep  Patient monitoring: heart rate, cardiac monitor and continuous pulse ox  Block type: IPACK  Laterality: right  Injection technique: single-shot  Local infiltration: lidocaine  Infiltration strength: 1 %  Dose: 1 mL  Needle  Needle type: pencil-tip   Needle gauge: 22 G  Needle length: 10 cm  Needle localization: ultrasound guidance     image stored in chart  Assessment  Injection assessment: negative aspiration for heme, no paresthesia on injection, incremental injection and local visualized surrounding nerve on ultrasound  Paresthesia pain: none  Heart rate change: no  Slow fractionated injection: yes  Additional Notes  Procedure in Detail:     Prior to the procedure, the correct patient, procedure and site were confirmed, and the chart reviewed.  Informed consent discussing the risks and benefits of the procedure was obtained.  Risks discussed included, but were not limited to, the possibility of infection, bleeding, and permanent nerve damage.    A Neurologic Exam revealed: grossly normal.            The patient was positioned supine with the knee slightly flexed.  An ultrasound transducer was placed transversely of the medial aspect of the knee, approximately 2-3cm above the patella.  The femur shaft was identified and the popliteal artery was seen posterior to the femur.  The anatomy was noted to be  normal.1% lidocaine was infiltrated at the needle insertion site. Using a 22g 10cm insulated needle the space was approached from the anteromedial aspect of the knee, toward the space between the poplieal artery and femur.       After negative aspiration, slow injection of 20 mls local anesthetic solution containing 0.5% Ropivicaine without additives was performed. The tip of the needle was visualized throughout the procedure and was slowly withdrawn to disperse local anesthetic throughout the space. A total of 1 pass with the needle were made prior to the start of local anesthetic injection.  The needle was repositioned 1 times during the procedure. There was not evidence of intraneural injection or arterial puncture.     Pain Diagnosis:   knee     Complexity and/or technical difficulty of this procedure were increased due to: na

## 2025-05-12 NOTE — ANESTHESIA PROCEDURE NOTES
Spinal Block    Patient location during procedure: OR  Start time: 5/12/2025 9:30 AM  End time: 5/12/2025 9:35 AM  Reason for block: primary anesthetic  Staffing  Performed: attending   Authorized by: Markie Morfin MD    Performed by: Markie Morfin MD    Preanesthetic Checklist  Completed: patient identified, IV checked, site marked, risks and benefits discussed, surgical consent, monitors and equipment checked, pre-op evaluation, timeout performed and sterile techniques followed  Block Timeout  RN/Licensed healthcare professional reads aloud to the Anesthesia provider and entire team: Patient identity, procedure with side and site, patient position, and as applicable the availability of implants/special equipment/special requirements.  Patient on coagulant treatment: no  Timeout performed at: 5/12/2025 9:30 AM  Spinal Block  Patient position: sitting  Prep: Betadine  Sterility prep: cap, drape, gloves, hand hygiene and mask  Sedation level: light sedation  Approach: midline  Vertebral space: L3-4  Injection technique: single-shot  Needle  Needle type: pencil-point   Needle gauge: 24 G  Needle length: 4 in  Free flowing CSF: yes    Assessment  Sensory level: T10 bilateral  Block outcome: pain improved  Procedure assessment: patient sedated but conversant throughout procedure and patient tolerated procedure well with no immediate complications

## 2025-05-12 NOTE — PROGRESS NOTES
05/12/25 1346   Discharge Planning   Living Arrangements Spouse/significant other   Support Systems Spouse/significant other   Assistance Needed PTA - lives with his spouse in a 2 level home, 3 ROBB and handrail. States bedroom and full bathroom are on 2nd floor, tub shower with grab bars. Has a 1/2 bath on main level. At baseline - reports being independent for ambulation and ADLs. Does not use any AD but owns a walker and cane. Shares IADLs with spouse. Drives and employed.   Type of Residence Private residence   Number of Stairs to Enter Residence 3   Number of Stairs Within Residence 13   Do you have animals or pets at home? Yes   Home or Post Acute Services In home services   Type of Home Care Services Home PT;Home OT   Expected Discharge Disposition Home H  ([facility])   Does the patient need discharge transport arranged? No   Patient Choice   Provider Choice list and CMS website (https://medicare.gov/care-compare#search) for post-acute Quality and Resource Measure Data were provided and reviewed with: Patient   Patient / Family choosing to utilize agency / facility established prior to hospitalization No   Intensity of Service   Intensity of Service 0-30 min     Post op right total knee revision with Dr Amato today. Ordered for WBAT. PT/OT eval pending. Pt prefers dc HOME with [facility]. Choice list provided from [facility] and pt chose [facility]. NP updated on need for internal HCO. PCP Dr Guerra who pt saw prior to surgery for clearance. Planned for dc tomorrow once medically cleared. Will need Meds to Beds. Address and phone confirmed.

## 2025-05-12 NOTE — PROGRESS NOTES
Physical Therapy    Physical Therapy Evaluation & Treatment    Patient Name: Gunnar Burton  MRN: [id number]  Today's Date: 5/12/2025   Time Calculation  Start Time: 1515  Stop Time: 1600  Time Calculation (min): 45 min  603/603-A    Assessment/Plan   PT Assessment  PT Assessment Results: Decreased strength, Decreased range of motion, Decreased endurance, Decreased mobility  Rehab Prognosis: Good  Barriers to Discharge Home: No anticipated barriers  Evaluation/Treatment Tolerance: Patient tolerated treatment well  Medical Staff Made Aware: Yes  Strengths: Ability to acquire knowledge, Access to adaptive/assistive products, Attitude of self, Coping skills, Living arrangement secure, Premorbid level of function, Support of Caregivers  End of Session Communication: Bedside nurse, PCT/NA/CTA  Assessment Comment: Recommend continued therapy in acute care followed by continued therapy at a low intensity level following D/C.  End of Session Patient Position: Up in chair, Alarm on (Call light within reach)  IP OR SWING BED PT PLAN  Inpatient or Swing Bed: Inpatient  PT Plan  Treatment/Interventions: Bed mobility, Transfer training, Gait training, Stair training, Strengthening, Range of motion, Therapeutic exercise, Home exercise program  PT Plan: Ongoing PT  PT Frequency: BID  PT Discharge Recommendations: Low intensity level of continued care  PT Recommended Transfer Status: Assist x1, Assistive device  Physical Therapy eval completed per MD requisition. P.T. recommendations as outlined above. Recommend D/C from acute care when medically appropriate as deemed by medical staff.      General Visit Information:  General  Reason for Referral: R TKA revision  Referred By: Dr. Amato (PT/OT 5/12)  Past Medical History Relevant to Rehab: includes: B TKA, HTN, HLD, OA, BPH, hernia surgery, skin CA  Family/Caregiver Present: No  Prior to Session Communication: Bedside nurse  Patient Position Received: Bed, 3 rail up, Alarm off, not  on at start of session  Preferred Learning Style: auditory, verbal, written  General Comment: Pt. is a 65yo who presented to [facility] on 5/12/2025 for a scheduled R TKA tibial revision wiht poly liner exchange by Dr. Amato    Home Living:  Home Living  Home Living Comments: Pt. lives with spouse in a 2 level house with 3 ROBB with HR. Pt.'s son will also be staying at Pt.'s house x 2 weeks. Pt has moved a bed into the dining room on 1st floor but mormally his bed/full bath are on 2nd floor with tub shower with a seat available and bars. 1 flight wiht B HR to 2nd floor. 1/2 bath on 1st floor.    Prior Level of Function:  Prior Function Per Pt/Caregiver Report  Prior Function Comments: Pt. amb without AD PTA but owns a cane and a FWW. Pt. was I with ADLs and IADLs PTA. Pt. denied falls in last 3 months. 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    Precautions:  Precautions  LE Weight Bearing Status: Weight Bearing as Tolerated         Objective     Pain:  Pain Assessment  Pain Assessment: 0-10  0-10 (Numeric) Pain Score: 3  Pain Type: Acute pain, Surgical pain  Pain Location: Knee  Pain Orientation: Right  Pain Interventions: Cold applied, Repositioned  Response to Interventions: No change in pain    Cognition:  Cognition  Overall Cognitive Status: Within Functional Limits  Orientation Level: Oriented X4    General Assessments:  General Observation  General Observation: IV, Purewick   Activity Tolerance  Endurance: Tolerates 10 - 20 min exercise with multiple rests                 Dynamic Sitting Balance  Dynamic Sitting-Comments: Good static and dynamic sitting balance  Dynamic Standing Balance  Dynamic Standing-Comments: Fair+ static and dynamic standing balance    Functional Assessments:     Bed Mobility  Bed Mobility: Yes  Bed Mobility 1  Bed Mobility 1: Supine to sitting  Level of Assistance 1: Contact guard  Bed Mobility Comments 1: CGA for safety and VC's to use B UEs to elevate trunk and scoot hips to  EOB.  Transfers  Transfer: Yes  Transfer 1  Technique 1: Sit to stand  Transfer Device 1: Gait belt (FWW)  Transfer Level of Assistance 1: Minimum assistance  Trials/Comments 1: A for lifting, transferring weight forward over feet, steadying.  Transfers 2  Technique 2: Stand to sit  Transfer Device 2: Gait belt (FWW)  Trials/Comments 2: CGA for safety.  Ambulation/Gait Training  Ambulation/Gait Training Performed: Yes  Ambulation/Gait Training 1  Surface 1: Level tile  Device 1: Rolling walker  Gait Support Devices: Gait belt  Assistance 1: Minimum assistance  Quality of Gait 1: Narrow base of support (slow, step-to gait with shortened step lengths)  Comments/Distance (ft) 1: 10'; Pt. amb with A for balance, safety, weight shift. VC's for sequence, walker placement.  Stairs  Stairs: No       Extremity/Trunk Assessments:  RUE   RUE : Within Functional Limits  LUE   LUE: Within Functional Limits  RLE   RLE : Exceptions to WFL  AROM RLE (degrees)  RLE AROM Comment: Hip and ankle WFL, knee -7° to 60°, supine  Strength RLE  RLE Overall Strength:  (Quad 4-/5, ankle DF 4+/5)  LLE   LLE : Exceptions to WFL  AROM LLE (degrees)  LLE AROM Comment: Hip and ankle WFL, knee 0° to ~60°, supine    Treatments:  Therapeutic Exercise  Therapeutic Exercise Performed: Yes  Therapeutic Exercise Activity 1: Supine B LE ther ex 1 x 15: QS, AP, GS, Hip ABD, HS, SAQ Pt. was able to complete a SLR with R LE          Ambulation/Gait Training  Ambulation/Gait Training Performed: Yes  Ambulation/Gait Training 2  Surface 2: Level tile  Device 2: Rolling walker  Gait Support Devices: Gait belt  Assistance 2: Contact guard  Quality of Gait 2: Narrow base of support (slow, steady, reciprocal gait)  Comments/Distance (ft) 2: 100'; CGA for safety        Outcome Measures:  [facility] Basic Mobility  Turning from your back to your side while in a flat bed without using bedrails: A little  Moving from lying on your back to sitting on the side of a flat bed  without using bedrails: A little  Moving to and from bed to chair (including a wheelchair): A little  Standing up from a chair using your arms (e.g. wheelchair or bedside chair): A little  To walk in hospital room: A little  Climbing 3-5 steps with railing: Total  Basic Mobility - Total Score: 16                            Goals:  Encounter Problems       Encounter Problems (Active)       PT Problem       Pt. will transfer supine/sit with MOD I (Progressing)       Start:  05/12/25    Expected End:  05/14/25            Pt. will transfer sit/stand with FWW with MOD I (Progressing)       Start:  05/12/25    Expected End:  05/14/25            Pt.will ambulate 250' with FWW with MOD I  (Progressing)       Start:  05/12/25    Expected End:  05/14/25            Pt. will amb up/down 3 steps with HR and cane with CGA  (Not Progressing)       Start:  05/12/25    Expected End:  05/14/25            Pt. will amb up/down 10 steps with B HR with CGA  (Not Progressing)       Start:  05/12/25    Expected End:  05/14/25            Pt. will demonstrate 0-90 degrees AROM R knee  (Progressing)       Start:  05/12/25    Expected End:  05/14/25            Pt. will complete SLR x 15 reps with R LE  (Progressing)       Start:  05/12/25    Expected End:  05/14/25                 Education Documentation  Home Exercise Program, taught by Roland Hernandez PT at 5/12/2025  4:29 PM.  Learner: Patient  Readiness: Acceptance  Method: Explanation  Response: Verbalizes Understanding, Needs Reinforcement  Comment: Role of PT, transfers, amb, safety, PT POC, ther ex    Mobility Training, taught by Roland Hernandez PT at 5/12/2025  4:29 PM.  Learner: Patient  Readiness: Acceptance  Method: Explanation  Response: Verbalizes Understanding, Needs Reinforcement  Comment: Role of PT, transfers, amb, safety, PT POC, ther ex

## 2025-05-12 NOTE — ANESTHESIA PREPROCEDURE EVALUATION
"Gunnar Burton is a 64 y.o. male here for:      RIGHT KNEE POLY SWAP POLYATHEYLENE EXCHANGE VS TIBIEL REVISION AND POLY EXCHANGE  With Edin Amato MD  Pre-Op Diagnosis Codes:      * Mechanical loosening of internal right knee prosthetic joint, initial encounter [T84.032A]     * Instability of internal right knee prosthesis, initial encounter [T84.022A]    Lab Results   Component Value Date    HGB 13.2 (L) 04/28/2025    HCT 39.9 (L) 04/28/2025    WBC 5.0 04/28/2025     04/28/2025     04/28/2025    K 4.2 04/28/2025     04/28/2025    CREATININE 1.04 04/28/2025    BUN 21 04/28/2025       Social History     Substance and Sexual Activity   Drug Use Defer      Tobacco Use: Low Risk  (5/12/2025)    Patient History     Smoking Tobacco Use: Never     Smokeless Tobacco Use: Never     Passive Exposure: Past      Social History     Substance and Sexual Activity   Alcohol Use Never        RX Allergies[1]    Current Outpatient Medications   Medication Instructions    acetaminophen (TYLENOL) 500 mg, Every 6 hours PRN    atorvastatin (Lipitor) 20 mg tablet 1 tablet, Daily    chlorhexidine (Peridex) 0.12 % solution 15 mL, Mouth/Throat, As needed, Use the night before and the morning of your upcoming orthopedic surgery    lisinopriL-hydrochlorothiazide 20-12.5 mg tablet 1 tablet, Daily RT    meloxicam (Mobic) 15 mg tablet 1 tablet, Daily PRN    PSYLLIUM HUSK ORAL 1 capsule, Daily    tamsulosin (Flomax) 0.4 mg 24 hr capsule 1 capsule, Nightly       Medical History[2]    Surgical History[3]    Family History[4]    Relevant Problems   No relevant active problems       Visit Vitals  /69   Pulse 57   Temp 36.4 °C (97.5 °F) (Temporal)   Resp 16   Ht 1.676 m (5' 6\")   Wt 81.9 kg (180 lb 8.9 oz)   SpO2 98%   BMI 29.14 kg/m²   Smoking Status Never   BSA 1.95 m²       NPO Details:  NPO/Void Status  Date of Last Liquid: 05/11/25  Time of Last Liquid: 1900  Date of Last Solid: 05/11/25  Time of Last Solid: " 2000        Physical Exam  Airway  Mallampati: II    Cardiovascular - normal exam  Dental - normal exam  Pulmonary - normal exam  Neurological - normal exam  Abdominal - normal exam        Anesthesia Plan    History of general anesthesia?: yes  History of complications of general anesthesia?: no    ASA 2     spinal and regional     intravenous induction   Anesthetic plan and risks discussed with patient.           [1] No Known Allergies  [2]   Past Medical History:  Diagnosis Date    Arthritis     BPH (benign prostatic hyperplasia)     Does use hearing aid     Patient states he doesn't always wear hearing aid    High cholesterol     Hypertension     Precancerous skin lesion     scalp   [3]   Past Surgical History:  Procedure Laterality Date    COLONOSCOPY      HERNIA REPAIR Right     SKIN CANCER DESTRUCTION      scalp    TOTAL KNEE ARTHROPLASTY Bilateral     UMBILICAL HERNIA REPAIR     [4] No family history on file.

## 2025-05-12 NOTE — PROGRESS NOTES
Patient seen and evaluated upon arrival to floor s/p revision RTKA. Doing well post operatively. Pain is well controlled. Vitals stable. He is resting comfortably in bed at this time.    Strong equal dorsi/plantar flexion bilaterally. Reports slight numbness/tingling to operative extremity. Dressing remains clean, dry, and intact.    PT/OT: Weight-bearing as tolerated to operative extremity with use of walker for assistance with ambulating. Okay for ROM knee    Aspirin 81 mg BID for DVT prophylaxis for 30 days.     Patient plans to return home with  home care upon discharge, orders placed. TCC and SW following for discharge planning.

## 2025-05-12 NOTE — INTERVAL H&P NOTE
Interval History and Physical     I have interviewed and examined the patient and reviewed the recent History and Physical.  There have been no changes to the recent H&P documentation.     The patient understands the planned operation and its associated risks and benefits and agrees to proceed.     The surgical consent form has been signed.    Visit Vitals  /69   Pulse 57   Temp 36.4 °C (97.5 °F) (Temporal)   Resp 16        Edin Amato MD

## 2025-05-12 NOTE — ANESTHESIA PROCEDURE NOTES
Peripheral Block    Patient location during procedure: pre-op  Medication administered at: 5/12/2025 8:26 AM  End time: 5/12/2025 8:31 AM  Reason for block: at surgeon's request and post-op pain management  Staffing  Performed: attending   Authorized by: Markie Morfin MD    Performed by: Markie Morfin MD  Preanesthetic Checklist  Completed: patient identified, IV checked, site marked, risks and benefits discussed, surgical consent, monitors and equipment checked, pre-op evaluation and timeout performed   Timeout performed at: 5/12/2025 8:20 AM  Peripheral Block  Patient position: laying flat  Prep: ChloraPrep  Patient monitoring: heart rate, cardiac monitor and continuous pulse ox  Block type: adductor canal  Laterality: right  Injection technique: single-shot  Local infiltration: lidocaine  Infiltration strength: 1 %  Dose: 1 mL  Needle  Needle type: pencil-tip   Needle gauge: 22 G  Needle length: 10 cm  Needle localization: ultrasound guidance     image stored in chart  Assessment  Injection assessment: negative aspiration for heme, no paresthesia on injection, incremental injection and local visualized surrounding nerve on ultrasound  Paresthesia pain: none  Heart rate change: no  Slow fractionated injection: yes  Additional Notes  Procedure in Detail:     Prior to the procedure, the correct patient, procedure and site were confirmed, and the chart reviewed.  Informed consent discussing the risks and benefits of the procedure was obtained.  Risks discussed included, but were not limited to, the possibility of infection, bleeding, and permanent nerve damage.    A Neurologic Exam revealed: grossly normal.            The anterior and medial thigh was prepped and draped in a sterile fashion.  The procedure was completed using ultrasound guidance without nerve stimulation.  A linear ultrasound probe was placed on the medial thigh approximately midway between the inquinal crease and the knee.  The femoral artery  was identified within the adductor canal, inferior to the Satorius muscle and medial to the Vastus Intermedius muscle.  The anatomy was noted to be normal.1% lidocaine was infiltrated at the needle insertion site. Using a 22g 10cm insulated needle the nerve was approached from lateral to medial in plane with the probe.      After negative aspiration, slow injection of 20 mls local anesthetic solution containing 0.5% Ropivicaine with 5mg decadron as an additive. The tip of the needle was visualized throughout the procedure. A total of 1 pass with the needle were made prior to the start of local anesthetic injection.  The needle was repositioned 1 times during the procedure. There was not evidence of intraneural injection.     Pain Diagnosis:   knee     Complexity and/or technical difficulty of this procedure were increased due to: na

## 2025-05-12 NOTE — OP NOTE
RIGHT KNEE POLY SWAP POLYATHEYLENE EXCHANGE VS TIBIEL REVISION AND POLY EXCHANGE (R) Operative Note     Date: 2025  OR Location: [facility]    Name: Gunnar Burton, : 1960, Age: 64 y.o., MRN: [id number], Sex: male    Diagnosis  Pre-op Diagnosis      * Mechanical loosening of internal right knee prosthetic joint, initial encounter [T84.032A]     * Instability of internal right knee prosthesis, initial encounter [T84.022A] Post-op Diagnosis     * Mechanical loosening of internal right knee prosthetic joint, initial encounter [T84.032A]     * Instability of internal right knee prosthesis, initial encounter [T84.022A]     Procedures  RIGHT KNEE POLY SWAP POLYATHEYLENE EXCHANGE VS TIBIEL REVISION AND POLY EXCHANGE  25530 - AR REVJ TOTAL KNEE ARTHRP W/WO ALGRFT 1 COMPONENT      Surgeons      * Edin Amato - Primary    Resident/Fellow/Other Assistant:  Surgeons and Role:     * Tj Leal PA-C - Assisting    Staff:   Circulator: Zina  Scrub Person: Shara    Anesthesia Staff: Anesthesiologist: Markie Morfin MD    Procedure Summary  Anesthesia: Regional, Spinal  ASA: II  Estimated Blood Loss: 50 mL  Intra-op Medications:   Administrations occurring from 0830 to 1145 on 25:   Medication Name Total Dose   ropivacaine-epinephrine-clonidine-ketorolac 2.46-0.005- 0.0008-0.3mg/mL periarticular syringe 100 mL   sodium chloride 0.9 % irrigation solution 1,000 mL   bupivacaine PF 0.75 %-dextrose 8.25 % (Sensorcaine) intrathecal 1.1 mL   lidocaine PF (Xylocaine-MPF)  2 % 50 mg   phenylephrine 100 mcg/mL syringe 10 mL (prefilled) 100 mcg   propofol (Diprivan) injection 10 mg/mL 843.96 mg   ceFAZolin (Ancef) 2 g in dextrose (iso) IV 50 mL 2 g   dexAMETHasone (PF) (Decadron) 5 mg, ropivacaine (Naropin) 5 mg/mL (0.5 %) 100 mg injection Cannot be calculated   ropivacaine (Naropin) 5 mg/mL (0.5 %) injection 100 mg 100 mg              Anesthesia Record               Intraprocedure I/O Totals       None            Specimen:   ID Type Source Tests Collected by Time   A : FLUID CULTURE RIGHT KNEE Swab KNEE ARTHROPLASTY RIGHT TISSUE/WOUND CULTURE/SMEAR Edin Amato MD 2025 1004                 Drains and/or Catheters: * None in log *    Tourniquet Times:   * Missing tourniquet times found for documented tourniquets in lo *     Implants:  Implants       Type Name Action Serial No.       RIGHT, 18MM 6-7/EF, PERSONA MEDIAL CONGRUENT ARTICULATING SURFACE, TIBIAL INSERT, VIVACIT-E Implanted               Findings: Mid flexion instability    Indications: Gunnar Burton is an 64 y.o. male who is having surgery for Mechanical loosening of internal right knee prosthetic joint, initial encounter (CMS-[facility]) [T84.032A]  Instability of internal right knee prosthesis, initial encounter (CMS-[facility]) [T84.022A].     The patient was seen in the preoperative area. The risks, benefits, complications, treatment options, non-operative alternatives, expected recovery and outcomes were discussed with the patient. The possibilities of reaction to medication, pulmonary aspiration, injury to surrounding structures, bleeding, recurrent infection, the need for additional procedures, failure to diagnose a condition, and creating a complication requiring transfusion or operation were discussed with the patient. The patient concurred with the proposed plan, giving informed consent.  The site of surgery was properly noted/marked if necessary per policy. The patient has been actively warmed in preoperative area. Preoperative antibiotics have been ordered and given within 1 hours of incision. Venous thrombosis prophylaxis have been ordered including bilateral sequential compression devices and chemical prophylaxis    Procedure Details:   Indications:  This is a 64-year-old male who is approximately 3 years status post right total knee arthroplasty.  He has developed instability of the knee.  On clinical exam he had varus and valgus instability  at 0, 30 and 60 degrees.  He was also unstable in the AP plane at 90 degrees.  Polyethylene exchange with possible revision of the tibial component was offered.  The procedure was explained along the risks, benefits and alternatives being reviewed.  Potential risk really not limited to infection, neurovascular complication, continued pain and/or instability, patellar instability as well as the need for reoperation were all discussed with the patient consented to the procedure.    Operative procedure:  The patient was brought the operative suite.  A spinal anesthetic was administered per anesthesia.  The patient was then placed in the supine position.  All bony promises were well-padded.  A tourniquet is placed on the right thigh proximal and over Webril.  U-Drape was used to exclude the right lower extremity distal to the tourniquet from the rest of the body.  A bump was placed under the right hip.  The right lower extremity was then sterilely prepped with ChloraPrep then sterilely draped in the usual manner.    A timeout was performed.  The patient was identified.  The site and laterality were confirmed along with the administration of antibiotics.    I began by exsanguinating the right lower extremity inflating the tourniquet to 250 mmHg.  Next I made an incision through the previous incision.  Dissection was carried down carefully through the subcutaneous and fatty tissues.  I took dissection down to the quadriceps tendon, the prepatellar bursa and the peritenon over the patellar tendon.  I mobilized medially and laterally.  Identified my suture line medially.  Next I entered the knee joint by incising through the medial aspect of the quadriceps tendon through the medial retinaculum and down to the bone of the proximal tibia medial to the patellar tendon.  I used my previous incision line.  The Ethibond suture was then removed.  I began by performing a medial release for sharply with a knife and completed the  foot with an elevator.  I undermined behind the patellar tendon.  I then debrided around the patella where scar tissue had formed.  I debrided the scar tissue from the suprapatellar pouch.  Of note there was intra-articular fluid which was sent for culture.    After debriding the scar I removed the tibial insert.  I then debrided around the tibial tray.  I checked the tray for loosening by hitting this with a tamp.  The tibia was not loose.  I debrided around the femur and hit on it with a tamp.  There is no evidence of femoral loosening.  I began trialing then first with a 16 mm insert followed by an 18 mm insert.  The 18 mm insert provided me the stability varus and valgus stress knee at 0 30 and 60 degrees as well as in the AP plane at 90 degrees.  In high flexion the patella was laterally subluxing and tilting.  I released the tourniquet.  I then placed a towel clip and again on taking the patella through range of motion I felt that there was a tendency for some lateral subluxation.  I performed a lateral release in standard fashion.  Hemostasis was maintained with electrocautery.  I then removed the trial insert.  I thoroughly copiously irrigated with normal saline.  I then infiltrated the soft tissues with a mixture of ropivacaine with epinephrine, Toradol and clonidine.  Currently copiously irrigated with normal saline as well as with Irrisept.  I then placed the permanent 18 mm MC insert and locked this into place.  I took the knee through range of motion and was satisfied with my patellar tracking.  Attention was turned to closure.    The quadriceps tendon medial retinaculum and proximal tibial tissues were repaired with Ethibond suture.  Subcutaneous closure was with 2-0 Vicryl.  The skin was closed with Monocryl.  Exofin glue was applied.  A sterile Mepilex dressing was applied followed by an Ace.    The physician assistant was present for the entire case.  Given the nature of the procedure and disease  process a skilled surgical assistant was necessary for the case.  The assistant was necessary for retraction and helped directly facilitate completion of the surgery.  A certified scrub tech was at the back table managing instruments and supplies for the surgical procedure.    Evidence of Infection: No   Complications:  None; patient tolerated the procedure well.    Disposition: PACU - hemodynamically stable.  Condition: stable                 Additional Details: Not applicable    Attending Attestation: I performed the procedure.    Edin Amato  Phone Number: [phone]

## 2025-05-13 ENCOUNTER — DOCUMENTATION (OUTPATIENT)
Dept: HOME HEALTH SERVICES | Facility: HOME HEALTH | Age: 65
End: 2025-05-13
Payer: COMMERCIAL

## 2025-05-13 ENCOUNTER — PHARMACY VISIT (OUTPATIENT)
Dept: PHARMACY | Facility: CLINIC | Age: 65
End: 2025-05-13
Payer: COMMERCIAL

## 2025-05-13 ENCOUNTER — HOME HEALTH ADMISSION (OUTPATIENT)
Dept: HOME HEALTH SERVICES | Facility: HOME HEALTH | Age: 65
End: 2025-05-13
Payer: COMMERCIAL

## 2025-05-13 VITALS
SYSTOLIC BLOOD PRESSURE: 118 MMHG | RESPIRATION RATE: 16 BRPM | HEART RATE: 59 BPM | OXYGEN SATURATION: 98 % | TEMPERATURE: 97.2 F | BODY MASS INDEX: 29.02 KG/M2 | DIASTOLIC BLOOD PRESSURE: 69 MMHG | HEIGHT: 66 IN | WEIGHT: 180.56 LBS

## 2025-05-13 PROBLEM — T84.022A INSTABILITY OF INTERNAL RIGHT KNEE PROSTHESIS: Status: RESOLVED | Noted: 2025-03-11 | Resolved: 2025-05-13

## 2025-05-13 PROBLEM — T84.032A MECHANICAL LOOSENING OF INTERNAL RIGHT KNEE PROSTHETIC JOINT, INITIAL ENCOUNTER: Status: RESOLVED | Noted: 2025-05-12 | Resolved: 2025-05-13

## 2025-05-13 PROBLEM — T84.032A MECHANICAL LOOSENING OF INTERNAL RIGHT KNEE PROSTHETIC JOINT: Status: RESOLVED | Noted: 2025-03-11 | Resolved: 2025-05-13

## 2025-05-13 LAB
ANION GAP SERPL CALC-SCNC: 9 MMOL/L (ref 10–20)
BUN SERPL-MCNC: 26 MG/DL (ref 6–23)
CALCIUM SERPL-MCNC: 8.8 MG/DL (ref 8.6–10.3)
CHLORIDE SERPL-SCNC: 105 MMOL/L (ref 98–107)
CO2 SERPL-SCNC: 26 MMOL/L (ref 21–32)
CREAT SERPL-MCNC: 1.36 MG/DL (ref 0.5–1.3)
EGFRCR SERPLBLD CKD-EPI 2021: 59 ML/MIN/1.73M*2
ERYTHROCYTE [DISTWIDTH] IN BLOOD BY AUTOMATED COUNT: 13 % (ref 11.5–14.5)
GLUCOSE SERPL-MCNC: 107 MG/DL (ref 74–99)
HCT VFR BLD AUTO: 33 % (ref 41–52)
HGB BLD-MCNC: 11.1 G/DL (ref 13.5–17.5)
MCH RBC QN AUTO: 28.3 PG (ref 26–34)
MCHC RBC AUTO-ENTMCNC: 33.6 G/DL (ref 32–36)
MCV RBC AUTO: 84 FL (ref 80–100)
NRBC BLD-RTO: 0 /100 WBCS (ref 0–0)
PLATELET # BLD AUTO: 163 X10*3/UL (ref 150–450)
POTASSIUM SERPL-SCNC: 4 MMOL/L (ref 3.5–5.3)
RBC # BLD AUTO: 3.92 X10*6/UL (ref 4.5–5.9)
SODIUM SERPL-SCNC: 136 MMOL/L (ref 136–145)
WBC # BLD AUTO: 8.8 X10*3/UL (ref 4.4–11.3)

## 2025-05-13 PROCEDURE — 97110 THERAPEUTIC EXERCISES: CPT | Mod: GP,CQ

## 2025-05-13 PROCEDURE — 2500000001 HC RX 250 WO HCPCS SELF ADMINISTERED DRUGS (ALT 637 FOR MEDICARE OP): Performed by: ORTHOPAEDIC SURGERY

## 2025-05-13 PROCEDURE — 2500000001 HC RX 250 WO HCPCS SELF ADMINISTERED DRUGS (ALT 637 FOR MEDICARE OP): Performed by: ANESTHESIOLOGY

## 2025-05-13 PROCEDURE — 82374 ASSAY BLOOD CARBON DIOXIDE: CPT | Performed by: ORTHOPAEDIC SURGERY

## 2025-05-13 PROCEDURE — 97530 THERAPEUTIC ACTIVITIES: CPT | Mod: GP,CQ

## 2025-05-13 PROCEDURE — 97535 SELF CARE MNGMENT TRAINING: CPT | Mod: GO

## 2025-05-13 PROCEDURE — 2500000004 HC RX 250 GENERAL PHARMACY W/ HCPCS (ALT 636 FOR OP/ED): Mod: JZ | Performed by: ORTHOPAEDIC SURGERY

## 2025-05-13 PROCEDURE — 85027 COMPLETE CBC AUTOMATED: CPT | Performed by: ORTHOPAEDIC SURGERY

## 2025-05-13 PROCEDURE — RXMED WILLOW AMBULATORY MEDICATION CHARGE

## 2025-05-13 PROCEDURE — 2500000002 HC RX 250 W HCPCS SELF ADMINISTERED DRUGS (ALT 637 FOR MEDICARE OP, ALT 636 FOR OP/ED): Performed by: ORTHOPAEDIC SURGERY

## 2025-05-13 PROCEDURE — 97116 GAIT TRAINING THERAPY: CPT | Mod: GP,CQ

## 2025-05-13 PROCEDURE — 99232 SBSQ HOSP IP/OBS MODERATE 35: CPT

## 2025-05-13 PROCEDURE — 36415 COLL VENOUS BLD VENIPUNCTURE: CPT | Performed by: ORTHOPAEDIC SURGERY

## 2025-05-13 PROCEDURE — 97165 OT EVAL LOW COMPLEX 30 MIN: CPT | Mod: GO

## 2025-05-13 RX ORDER — CYCLOBENZAPRINE HCL 10 MG
10 TABLET ORAL 3 TIMES DAILY PRN
Qty: 21 TABLET | Refills: 0 | Status: SHIPPED | OUTPATIENT
Start: 2025-05-13 | End: 2025-05-20

## 2025-05-13 RX ORDER — ASPIRIN 81 MG/1
81 TABLET ORAL 2 TIMES DAILY
Qty: 60 TABLET | Refills: 0 | Status: SHIPPED | OUTPATIENT
Start: 2025-05-13 | End: 2025-06-12

## 2025-05-13 RX ORDER — DOCUSATE SODIUM 100 MG/1
100 CAPSULE, LIQUID FILLED ORAL 2 TIMES DAILY
Qty: 20 CAPSULE | Refills: 0 | Status: SHIPPED | OUTPATIENT
Start: 2025-05-13 | End: 2025-05-23

## 2025-05-13 RX ORDER — OXYCODONE HYDROCHLORIDE 5 MG/1
5 TABLET ORAL EVERY 6 HOURS PRN
Qty: 28 TABLET | Refills: 0 | Status: SHIPPED | OUTPATIENT
Start: 2025-05-13 | End: 2025-05-20

## 2025-05-13 RX ADMIN — OXYCODONE 10 MG: 5 TABLET ORAL at 08:51

## 2025-05-13 RX ADMIN — ACETAMINOPHEN 650 MG: 325 TABLET ORAL at 01:08

## 2025-05-13 RX ADMIN — ACETAMINOPHEN 650 MG: 325 TABLET ORAL at 11:09

## 2025-05-13 RX ADMIN — ASPIRIN 81 MG: 81 TABLET, COATED ORAL at 08:32

## 2025-05-13 RX ADMIN — DOCUSATE SODIUM 100 MG: 100 CAPSULE, LIQUID FILLED ORAL at 08:32

## 2025-05-13 RX ADMIN — KETOROLAC TROMETHAMINE 15 MG: 30 INJECTION, SOLUTION INTRAMUSCULAR at 01:09

## 2025-05-13 RX ADMIN — KETOROLAC TROMETHAMINE 15 MG: 30 INJECTION, SOLUTION INTRAMUSCULAR at 06:09

## 2025-05-13 RX ADMIN — CEFAZOLIN SODIUM 2 G: 2 SOLUTION INTRAVENOUS at 01:09

## 2025-05-13 RX ADMIN — ACETAMINOPHEN 650 MG: 325 TABLET ORAL at 06:08

## 2025-05-13 RX ADMIN — TRANEXAMIC ACID 1950 MG: 650 TABLET ORAL at 06:08

## 2025-05-13 ASSESSMENT — COGNITIVE AND FUNCTIONAL STATUS - GENERAL
TURNING FROM BACK TO SIDE WHILE IN FLAT BAD: A LITTLE
MOVING TO AND FROM BED TO CHAIR: A LITTLE
DRESSING REGULAR LOWER BODY CLOTHING: A LITTLE
MOBILITY SCORE: 17
PERSONAL GROOMING: A LITTLE
TOILETING: A LITTLE
DRESSING REGULAR UPPER BODY CLOTHING: A LITTLE
DAILY ACTIVITIY SCORE: 22
EATING MEALS: A LITTLE
STANDING UP FROM CHAIR USING ARMS: A LITTLE
MOBILITY SCORE: 21
HELP NEEDED FOR BATHING: A LITTLE
CLIMB 3 TO 5 STEPS WITH RAILING: A LOT
WALKING IN HOSPITAL ROOM: A LITTLE
MOVING FROM LYING ON BACK TO SITTING ON SIDE OF FLAT BED WITH BEDRAILS: A LITTLE
DRESSING REGULAR LOWER BODY CLOTHING: A LITTLE
CLIMB 3 TO 5 STEPS WITH RAILING: A LITTLE
DAILY ACTIVITIY SCORE: 18
HELP NEEDED FOR BATHING: A LITTLE
MOVING TO AND FROM BED TO CHAIR: A LITTLE
WALKING IN HOSPITAL ROOM: A LITTLE

## 2025-05-13 ASSESSMENT — PAIN - FUNCTIONAL ASSESSMENT
PAIN_FUNCTIONAL_ASSESSMENT: 0-10

## 2025-05-13 ASSESSMENT — PAIN SCALES - GENERAL
PAINLEVEL_OUTOF10: 3
PAINLEVEL_OUTOF10: 7
PAINLEVEL_OUTOF10: 4
PAINLEVEL_OUTOF10: 3
PAINLEVEL_OUTOF10: 3

## 2025-05-13 ASSESSMENT — ACTIVITIES OF DAILY LIVING (ADL)
ADL_ASSISTANCE: INDEPENDENT
HOME_MANAGEMENT_TIME_ENTRY: 15
BATHING_ASSISTANCE: STAND BY

## 2025-05-13 ASSESSMENT — PAIN DESCRIPTION - LOCATION
LOCATION: KNEE
LOCATION: KNEE

## 2025-05-13 ASSESSMENT — PAIN DESCRIPTION - ORIENTATION
ORIENTATION: RIGHT
ORIENTATION: RIGHT

## 2025-05-13 NOTE — CARE PLAN
Problem: Pain  Goal: Free from opioid side effects throughout the shift  Outcome: Progressing     Problem: Fall/Injury  Goal: Not fall by end of shift  Outcome: Progressing  Goal: Use assistive devices by end of the shift  Outcome: Progressing     Problem: Pain - Adult  Goal: Verbalizes/displays adequate comfort level or baseline comfort level  Outcome: Progressing     Problem: Safety - Adult  Goal: Free from fall injury  Outcome: Progressing     Problem: Discharge Planning  Goal: Discharge to home or other facility with appropriate resources  Outcome: Progressing     Problem: Chronic Conditions and Co-morbidities  Goal: Patient's chronic conditions and co-morbidity symptoms are monitored and maintained or improved  Outcome: Progressing     Problem: Nutrition  Goal: Nutrient intake appropriate for maintaining nutritional needs  Outcome: Progressing     Problem: Skin  Goal: Decreased wound size/increased tissue granulation at next dressing change  Outcome: Progressing  Goal: Participates in plan/prevention/treatment measures  Outcome: Progressing  Goal: Prevent/manage excess moisture  Outcome: Progressing  Goal: Prevent/minimize sheer/friction injuries  Outcome: Progressing  Goal: Promote/optimize nutrition  Outcome: Progressing  Goal: Promote skin healing  Outcome: Progressing   The patient's goals for the shift include      The clinical goals for the shift include Pain management

## 2025-05-13 NOTE — CARE PLAN
The patient's goals for the shift include      The clinical goals for the shift include Patient will remain safe and HD stable during this shift    Problem: Pain  Goal: Free from opioid side effects throughout the shift  5/13/2025 0629 by Tariq Monroy RN  Outcome: Progressing  5/12/2025 2145 by Tariq Monory RN  Outcome: Progressing  5/12/2025 2145 by Tariq Monroy RN  Outcome: Progressing     Problem: Fall/Injury  Goal: Not fall by end of shift  5/13/2025 0629 by Tariq Monroy RN  Outcome: Progressing  5/12/2025 2145 by Tariq Monroy RN  Outcome: Progressing  5/12/2025 2145 by Tariq Monroy RN  Outcome: Progressing  Goal: Use assistive devices by end of the shift  5/13/2025 0629 by Tariq Monroy RN  Outcome: Progressing  5/12/2025 2145 by Tariq Monroy RN  Outcome: Progressing  5/12/2025 2145 by Tariq Monroy RN  Outcome: Progressing     Problem: Pain - Adult  Goal: Verbalizes/displays adequate comfort level or baseline comfort level  5/13/2025 0629 by Tariq Monroy RN  Outcome: Progressing  5/12/2025 2145 by Tariq Monroy RN  Outcome: Progressing     Problem: Safety - Adult  Goal: Free from fall injury  5/13/2025 0629 by Tariq Monroy RN  Outcome: Progressing  5/12/2025 2145 by Tariq Monroy RN  Outcome: Progressing     Problem: Discharge Planning  Goal: Discharge to home or other facility with appropriate resources  5/13/2025 0629 by Tariq oMnroy RN  Outcome: Progressing  5/12/2025 2145 by Tariq Monroy RN  Outcome: Progressing     Problem: Nutrition  Goal: Nutrient intake appropriate for maintaining nutritional needs  5/13/2025 0629 by Tariq Monroy RN  Outcome: Progressing  5/12/2025 2145 by Tariq Monroy RN  Outcome: Progressing     Problem: Skin  Goal: Decreased wound size/increased tissue granulation at next dressing change  5/13/2025 0629 by Tariq Monroy RN  Outcome: Progressing  5/12/2025 2145 by Tariq Monroy, RN  Outcome: Progressing  Goal: Participates in plan/prevention/treatment  measures  5/13/2025 0629 by Tariq Monroy RN  Outcome: Progressing  5/12/2025 2145 by Tariq Monroy RN  Outcome: Progressing  Goal: Prevent/manage excess moisture  5/13/2025 0629 by Tariq Monroy RN  Outcome: Progressing  5/12/2025 2145 by Tariq Monroy RN  Outcome: Progressing  Goal: Prevent/minimize sheer/friction injuries  5/13/2025 0629 by Tariq Monroy RN  Outcome: Progressing  5/12/2025 2145 by Tariq Monroy RN  Outcome: Progressing  Goal: Promote/optimize nutrition  5/13/2025 0629 by Tariq Monroy RN  Outcome: Progressing  5/12/2025 2145 by Tariq Monroy RN  Outcome: Progressing  Goal: Promote skin healing  5/13/2025 0629 by Tariq Monroy RN  Outcome: Progressing  5/12/2025 2145 by Tariq Monroy RN  Outcome: Progressing

## 2025-05-13 NOTE — CARE PLAN
The clinical goals for the shift include Pain management    Problem: Pain  Goal: Free from opioid side effects throughout the shift  Outcome: Adequate for Discharge     Problem: Fall/Injury  Goal: Not fall by end of shift  Outcome: Adequate for Discharge  Goal: Use assistive devices by end of the shift  Outcome: Adequate for Discharge     Problem: Pain - Adult  Goal: Verbalizes/displays adequate comfort level or baseline comfort level  Outcome: Adequate for Discharge     Problem: Safety - Adult  Goal: Free from fall injury  Outcome: Adequate for Discharge     Problem: Discharge Planning  Goal: Discharge to home or other facility with appropriate resources  Outcome: Adequate for Discharge     Problem: Chronic Conditions and Co-morbidities  Goal: Patient's chronic conditions and co-morbidity symptoms are monitored and maintained or improved  Outcome: Adequate for Discharge     Problem: Nutrition  Goal: Nutrient intake appropriate for maintaining nutritional needs  Outcome: Adequate for Discharge     Problem: Skin  Goal: Decreased wound size/increased tissue granulation at next dressing change  Outcome: Adequate for Discharge  Goal: Participates in plan/prevention/treatment measures  Outcome: Adequate for Discharge  Goal: Prevent/manage excess moisture  Outcome: Adequate for Discharge  Goal: Prevent/minimize sheer/friction injuries  Outcome: Adequate for Discharge  Goal: Promote/optimize nutrition  Outcome: Adequate for Discharge  Goal: Promote skin healing  Outcome: Adequate for Discharge

## 2025-05-13 NOTE — PROGRESS NOTES
Home care orders entered and internal referral to [facility] submitted. Notified intake nurse of referral. Awaiting confirmation of SOC for dc today. Notified NP that meds to beds are needed.     UPDATE- notified by [facility] that SOC is 24 hours. Pt made aware. Son will be picking pt up and transporting him home sometimes after 12:30.

## 2025-05-13 NOTE — DISCHARGE SUMMARY
Discharge summary  This patient Gunnar Burton was admitted to the hospital on 5/12/2025  after undergoing Procedure(s) (LRB):  RIGHT KNEE POLY SWAP POLYATHEYLENE EXCHANGE (Right) without complications that morning.    During the postoperative period,while in hospital, patient was medically managed by the hospitalist. Please see medial notes and H&P for patients additional diagnoses.  Ortho agrees with all medical diagnoses and treatments while patient in hospital.  No significant or unexpected findings or abnormal ortho imaging were noted during the hospital stay    Hospital course    Patient tolerated surgical procedure well and there was no complications. Patient progressed adequately through their recovery during hospital stay including PT and rehabilitation.    Patient was then D/C on  to home  in stable condition.  Patient was instructed on the use of pain medications, the signs and symptoms of infection, and was given our number to call should they have any questions or concerns following discharge.    Based on my clinical judgment, the patient was provided with a 7-day prescription for opioid medication at 30 MED, indicated for treatment of acute pain in the setting of recent surgery. OARRS report was run and has demonstrated an appropriate time course.  The patient has been provided with counseling pertaining to safe use of opioid medication.    Patient may WBAT to operative extremity with use of walker for assistance with ambulation   Mepilex dressing to be removed POD#7 and incision left open to air  Aspirin 81 mg BID for DVT prophylaxis started on 5/12/25 and to be taken for 30 days  Follow up with surgeon in 2 weeks

## 2025-05-13 NOTE — NURSING NOTE
This nurse introduced self and role to patient, prepared and provided AVS, sat with patient, started and completed discharge education, pt verbalized understanding, without further questions or concerns, agreeable and comfortable with discharge at this time, piv removed with catheter intact, no tele, pt states he has all of his belongings, pts states ride should be here 12:30ish, encouraged to let staff know when ready for transport to facility exit, discharge complete.

## 2025-05-13 NOTE — PROGRESS NOTES
Physical Therapy    Physical Therapy    Physical Therapy Treatment    Patient Name: Gunnar Burton  MRN: [id number]  Today's Date: 5/13/2025  Time Calculation  Start Time: 0915  Stop Time: 1000  Time Calculation (min): 45 min       Assessment/Plan   PT Assessment  PT Assessment Results: Decreased strength, Decreased range of motion, Decreased endurance, Decreased mobility  Rehab Prognosis: Good  Barriers to Discharge Home: No anticipated barriers  Evaluation/Treatment Tolerance: Patient tolerated treatment well  Medical Staff Made Aware: Yes  Strengths: Ability to acquire knowledge, Access to adaptive/assistive products, Attitude of self, Coping skills, Living arrangement secure, Premorbid level of function, Support of Caregivers  End of Session Communication: Bedside nurse  Assessment Comment:  (Pt progressing thoward goals- pain controlled with increased OOB activities.)  End of Session Patient Position: Up in chair, Alarm on     PT Plan  Treatment/Interventions: Bed mobility, Transfer training, Gait training, Stair training, Strengthening, Range of motion, Therapeutic exercise, Home exercise program  PT Plan: Ongoing PT  PT Frequency: BID  PT Discharge Recommendations: Low intensity level of continued care  PT Recommended Transfer Status: Assist x1, Assistive device      Current Problem:  1. Mechanical loosening of internal right knee prosthetic joint, initial encounter  Tissue/Wound Culture/Smear    Tissue/Wound Culture/Smear    Surgical Pathology Exam    Surgical Pathology Exam    aspirin 81 mg EC tablet    cyclobenzaprine (Flexeril) 10 mg tablet    docusate sodium (Colace) 100 mg capsule    oxyCODONE (Roxicodone) 5 mg immediate release tablet    Referral to Home Health      2. Instability of internal right knee prosthesis, initial encounter  Tissue/Wound Culture/Smear    Tissue/Wound Culture/Smear    Surgical Pathology Exam    Surgical Pathology Exam          General Visit Information:   PT  Visit  PT Received  On: 05/13/25  General  Reason for Referral: R TKR  Family/Caregiver Present: No  Prior to Session Communication: Bedside nurse  Patient Position Received: Up in chair, Alarm on  Preferred Learning Style: verbal  General Comment:  (Pt sitting up in recliner- ice to R knee.  Pt agreeable to therapy.)  Subjective     Precautions:  Precautions  LE Weight Bearing Status: Weight Bearing as Tolerated  Medical Precautions: Fall precautions  Precautions Comment: + SLR         Objective     Pain:  Pain Assessment  Pain Assessment: 0-10  0-10 (Numeric) Pain Score: 3  Pain Type: Surgical pain, Acute pain  Pain Location: Knee  Pain Orientation: Right  Pain Interventions: Cold applied      Extremity/Trunk Assessments:        AROM RLE (degrees)  R Knee Flexion 0-130: 81 (Heel slides on EOC  + SLR)  R Knee Extension 0-130: -2 (Passive stretching in longsitting position.)         Treatments:  Therapeutic Exercise  Therapeutic Exercise Performed: Yes  Therapeutic Exercise Activity 1: Pt performed supine ther ex including ankle pumps, quad sets, glut sets B/L LEs and heel slides and SAQ RLE 20 reps x1, SLR 5 reps x2  Cues for technique and breathing.  Therapeutic Exercise Activity 2: Pt performed seated ther ex including heel/toe raises and hip ABD, LAQ, heel slides, and marches 10 reps x1. Reviewed home exercise program. Educated on goal for 2-3 sets of 20 reps to tolerance. Pt with good understanding.           Ambulation/Gait Training  Ambulation/Gait Training Performed: Yes  Ambulation/Gait Training 1  Surface 1: Level tile  Device 1: Rolling walker  Assistance 1: Close supervision  Quality of Gait 1: Inconsistent stride length, Decreased step length  Comments/Distance (ft) 1:  (Pt ambulates with WW  WBAT with uneven strides with step through gait kristian 125 ftx2 with supervision.)  Transfers  Transfer: Yes  Transfer 1  Technique 1: Sit to stand, Stand to sit  Transfer Device 1: Walker  Transfer Level of Assistance 1: Modified  independent  Trials/Comments 1:  (Pt transfers with WW  WBAT with mod independent.)  Stairs  Stairs: Yes  Stairs  Rails 1: Bilateral  Curb Step 1: Yes  Assistance 1: Close supervision  Comment/Number of Steps 1:  (Pt performed stair climbing with 2 HRs, 1 HR and SPC with supervision.  4 steps x3.  Curb step performed with WW with mod independence.)       Outcome Measures:  [facility] Basic Mobility  Turning from your back to your side while in a flat bed without using bedrails: None  Moving from lying on your back to sitting on the side of a flat bed without using bedrails: None  Moving to and from bed to chair (including a wheelchair): A little  Standing up from a chair using your arms (e.g. wheelchair or bedside chair): None  To walk in hospital room: A little  Climbing 3-5 steps with railing: A little  Basic Mobility - Total Score: 21  Education Documentation  No documentation found.  Education Comments  No comments found.        EDUCATION:    Individual(s) Educated: Patient  Education Provided: Body Mechanics, Fall Risk, Home Exercise Program, Home Safety  Patient/Caregiver Demonstrated Understanding: yes  Patient Response to Education: Patient/Caregiver Verbalized Understanding of Information, Patient/Caregiver Performed Return Demonstration of Exercises/Activities, Patient/Caregiver Asked Appropriate Questions  Education Comment:  (Pt demonstrates good understanding of HEP and safety with using walker for ambulation and transfers.)  Encounter Problems       Encounter Problems (Active)       PT Problem       Pt. will transfer supine/sit with MOD I (Met)       Start:  05/12/25    Expected End:  05/14/25    Resolved:  05/13/25         Pt. will transfer sit/stand with FWW with MOD I (Met)       Start:  05/12/25    Expected End:  05/14/25    Resolved:  05/13/25         Pt.will ambulate 250' with FWW with MOD I  (Progressing)       Start:  05/12/25    Expected End:  05/14/25            Pt. will amb up/down 3 steps with  HR and cane with CGA  (Progressing)       Start:  05/12/25    Expected End:  05/14/25            Pt. will amb up/down 10 steps with B HR with CGA  (Progressing)       Start:  05/12/25    Expected End:  05/14/25            Pt. will demonstrate 0-90 degrees AROM R knee  (Progressing)       Start:  05/12/25    Expected End:  05/14/25            Pt. will complete SLR x 15 reps with R LE  (Progressing)       Start:  05/12/25    Expected End:  05/14/25               Pain - Adult

## 2025-05-13 NOTE — DISCHARGE INSTRUCTIONS
Revision Total Knee Replacement  Discharge Instructions    To prevent Clot formation, you have been placed on the following medication:  ASA 81 mg twice a day for 30 days started on 5/12/25.    Surgical Site Care:  Change dressing once a day and PRN (as needed). Apply 4 x 4 sponge and light tape. If glue present, leave open to air.  You may leave wound open to air after initial dressing removal, if wound is clean, dry and intact  If Aquacel Ag dressing is present, do not remove dressing for 7 days, unless heavily saturated. If heavily saturated, remove dressing and start using instructions above  Staples will be removed on post-operative day 14 and steri-strips applied  Showering is permitted starting POD1 if waterproof Aquacel dressing is present or when the incision is covered with 4 x 4 and Tegaderm waterproof dressing  Until all areas of incision are healed.    Physical Therapy:  Weight Bearing Status:  Weight-bearing as tolerated. Okay for range of motion to knee  Precautions, Per Physical Therapy Handout    Pain Medications  You were given Oxycodone  Wean off pain medications as you deem appropriate as long as pain is under control  Do not exceed 4,000 mg of acetaminophen from all sources in a 24 hour period    Cold packs/Ice packs/Machine  May be used 5 times daily for 15-30 minutes as necessary  Be sure to have a barrier (cloth, clothing, towel) between the site and the ice pack to prevent frostbite    Contact [facility] office if  Increased redness, swelling, drainage of any kind, and/or pain to surgery site.  As well as new onset fevers and or chills.  These could signify an infection.  Calf or thigh tenderness to touch as well as increased swelling or redness.  This could signify a clot formation.  Numbness or tingling to an area around the incision site or below the incision site (toes).  Any rash appears, increased  or new onset nausea/vomiting occur.  This may indicate a reaction to a  medication.  Phone # [phone].  Follow up with Surgeon in 2 weeks  I acknowledge that I have received federico hose and understand the instructions on how and when to wear them (on during the day, off at night)   Discharging RN who has gone over instructions and acknowledges federico hose have been received     Ice 5 times a day for 20 minutes each session to operative extremity for two weeks.   FEDERICO hose to be worn for three weeks. Can be removed for skin care and hygiene.   Incentive spirometer 10 times every hour while awake for two weeks.

## 2025-05-13 NOTE — PROGRESS NOTES
Occupational Therapy    Evaluation    Patient Name: Gunnar Burton  MRN: [id number]  Department: [facility]  Room: [facility]60-  Today's Date: 5/13/2025  Time Calculation  Start Time: 0851  Stop Time: 0919  Time Calculation (min): 28 min      Assessment:  Pt is s/p  R TKR revision with increased pain, decreased strength, endurance, and safety with functional transfers and ADL tasks. Patient requires additional training regarding proper techniques to safely complete ADLs. Additional training and education completed this date.   Prognosis: Good  Evaluation/Treatment Tolerance: Patient tolerated treatment well  End of Session Communication: Bedside nurse, PCT/NA/CTA  End of Session Patient Position: Up in chair, Alarm on  OT Assessment Results: Decreased ADL status, Decreased IADLs, Decreased endurance, Decreased functional mobility    Plan:  OT eval and treatment / education completed. No further skilled OT indicated at this time. Pt verbalizes confidence returning home and resuming self care tasks.   OT Discharge Recommendations: No further acute OT  OT - OK to Discharge: Yes     Subjective   Current Problem:  1. Mechanical loosening of internal right knee prosthetic joint, initial encounter  Tissue/Wound Culture/Smear    Tissue/Wound Culture/Smear    Surgical Pathology Exam    Surgical Pathology Exam    aspirin 81 mg EC tablet    cyclobenzaprine (Flexeril) 10 mg tablet    docusate sodium (Colace) 100 mg capsule    oxyCODONE (Roxicodone) 5 mg immediate release tablet    Referral to Home Health      2. Instability of internal right knee prosthesis, initial encounter  Tissue/Wound Culture/Smear    Tissue/Wound Culture/Smear    Surgical Pathology Exam    Surgical Pathology Exam        General:  General  Reason for Referral: Decline in self care performance; pt admit for elective surgery; s/p R TKA revision 5/12/25.  Referred By: Dr. Amato (PT/OT 5/12)  Past Medical History Relevant to Rehab: includes: B TKA, HTN, HLD, OA, BPH,  hernia surgery, skin CA  Family/Caregiver Present: No  Prior to Session Communication: Bedside nurse  Patient Position Received: Up in chair, Alarm on  General Comment: Pt pleasant and cooperative; agreeable to OT.    Precautions:  LE Weight Bearing Status: Weight Bearing as Tolerated  Medical Precautions: Fall precautions; R TKR revision      Pain:  Pain Assessment  Pain Assessment: 0-10  0-10 (Numeric) Pain Score: 3  Pain Type: Acute pain, Surgical pain  Pain Location: Knee  Pain Orientation: Right  Pain Interventions: Cold applied, Elevated    Objective   Cognition:  Overall Cognitive Status: Within Functional Limits  Processing Speed: Within funtional limits      Home Living:  Home Living Comments: Pt. lives with spouse in a 2 level house with 3 ROBB with HR. Pt.'s son will also be staying at Pt.'s house x 2 weeks. Spouse is battling CA and scheduled soon for surgery. Pt has moved a bed into the dining room on 1st floor (to ready for spouse surgery) but mormally his bed/full bath are on 2nd floor with tub shower; tub seat and bars; standard commode. 1 flight with B HR to 2nd floor. 1/2 bath on 1st floor. Has lift chair.  Prior Function:  Level of [place]: Independent with ADLs and functional transfers, Independent with homemaking with ambulation  ADL Assistance: Independent  Homemaking Assistance: Independent  Ambulatory Assistance: Independent  Vocational: Self employed (garage door installation)  Prior Function Comments: Pt. amb without AD PTA but owns a cane and a FWW. Pt. denied falls in last 3 months. Pt. drives.     ADL:  Equipment: Long-handled shoe horn  Eating Assistance: Independent  Grooming Assistance: Stand by  Grooming Deficit: Standing with assistive device  Bathing Assistance: Stand by (sponge bathing)  UE Dressing Deficit: Setup  LE Dressing Assistance: Minimal  LE Dressing Deficit: Requires assistive device for steadying, Increased time to complete, Supervision/safety, Verbal cueing,  Steadying, Setup, Use of adaptive equipment, Don/doff R shoe, Don/doff L shoe, Pull up over hips, Thread LLE into underwear, Thread RLE into underwear, Thread LLE into pants, Thread RLE into pants, Don/doff L sock, Don/doff R sock  Toileting Assistance with Device: Stand by  Functional Assistance: Stand by  Functional Deficit: Increased time to complete, Supervision/safety, Verbal cueing, Steadying, Setup  ADL Comments: ADLs assessed at FWW level.    Skilled BADL Treatment / Intervention  Adaptive equipment training  Safety deficit modifications  Compensatory training  Environmental modifications    Progress in BADL Status  Improvement noted  Modified independence level  Cueing required  Use of compensatory strategies  Use of adaptive equipment    Activity Tolerance:  Endurance: Endurance does not limit participation in activity    Bed Mobility/Transfers:    Transfers  Transfer: Yes  Transfer 1  Transfer From 1: Sit to, Stand to, Toilet to, Chair with arms to  Transfer Device 1: Walker  Transfer Level of Assistance 1: Close supervision  Trials/Comments 1: Pt educated in safe ADL transfer techniques including tub transfer. Pt demo step over tech with heavy grab bar use with CGA and cues for safety.    Functional Mobility:    SBA at FWW with initially min cues for safety; good application thereafter.    Sensation:  Sensation Comment: BUE sensation and proprioception WFL.  Strength:  Strength Comments: BUE AROM and strength WFL for ADL participation.  Coordination:  Movements are Fluid and Coordinated: Yes   Hand Function:  Gross Grasp: Functional  Coordination: Functional    Outcome Measures:[facility] Daily Activity  Putting on and taking off regular lower body clothing: A little  Bathing (including washing, rinsing, drying): A little  Putting on and taking off regular upper body clothing: None  Toileting, which includes using toilet, bedpan or urinal: None  Taking care of personal grooming such as brushing teeth:  None  Eating Meals: None  Daily Activity - Total Score: 22    Education Documentation  Precautions, taught by Tamera Smith OT at 5/13/2025 10:11 AM.  Learner: Patient  Readiness: Acceptance  Method: Explanation, Demonstration  Response: Verbalizes Understanding, Demonstrated Understanding    ADL Training, taught by Tamera Smith OT at 5/13/2025 10:11 AM.  Learner: Patient  Readiness: Acceptance  Method: Explanation, Demonstration  Response: Verbalizes Understanding, Demonstrated Understanding    Goals:  Pt demonstrate improved ADL task performance and safe functional transfers for safe discharge to least restrictive environment.  Pt goal: To go home.

## 2025-05-13 NOTE — HH CARE COORDINATION
Home Care received a Referral for Physical Therapy and Occupational Therapy. We have processed the referral for a Start of Care on 24 HOURS .     If you have any questions or concerns, please feel free to contact us at [phone]. Follow the prompts, enter your five digit zip code, and you will be directed to your care team on [facility] 1.

## 2025-05-13 NOTE — PROGRESS NOTES
Orthopedic Surgery Progress Note   TKA    Subjective:     Post-Operative Day # 1   Status Post right Revision of Right Total Knee, Poly Exchange    Systemic or Specific Complaints: No Complaints.     Currently doing well and states pain levels have decreased after doing PT.    Objective:     Visit Vitals  /69   Pulse 59   Temp 36.2 °C (97.2 °F)   Resp 16       General: alert and oriented, in no acute distress, appears stated age, appears stated age, and cooperative   Wound: wound clean and dry no evidence of infection, Mepelex clean, dry, and intact   Motion: Painful range of Motion   DVT Exam: No evidence of DVT seen on physical exam.  Negative Salazar's sign.  No significant calf/ankle edema.       right knee swollen but thigh soft to palpation. Strong equal dorsi/plantar flexion bilaterally. Good distal pulses bilaterally.      Data Review  Recent Results (from the past 24 hours)   Tissue/Wound Culture/Smear    Collection Time: 05/12/25 10:04 AM    Specimen: KNEE ARTHROPLASTY RIGHT; Swab   Result Value Ref Range    Tissue/Wound Culture/Smear No growth to date     Gram Stain No polymorphonuclear leukocytes seen     Gram Stain No organisms seen    CBC    Collection Time: 05/13/25  6:17 AM   Result Value Ref Range    WBC 8.8 4.4 - 11.3 x10*3/uL    nRBC 0.0 0.0 - 0.0 /100 WBCs    RBC 3.92 (L) 4.50 - 5.90 x10*6/uL    Hemoglobin 11.1 (L) 13.5 - 17.5 g/dL    Hematocrit 33.0 (L) 41.0 - 52.0 %    MCV 84 80 - 100 fL    MCH 28.3 26.0 - 34.0 pg    MCHC 33.6 32.0 - 36.0 g/dL    RDW 13.0 11.5 - 14.5 %    Platelets 163 150 - 450 x10*3/uL   Basic metabolic panel    Collection Time: 05/13/25  6:17 AM   Result Value Ref Range    Glucose 107 (H) 74 - 99 mg/dL    Sodium 136 136 - 145 mmol/L    Potassium 4.0 3.5 - 5.3 mmol/L    Chloride 105 98 - 107 mmol/L    Bicarbonate 26 21 - 32 mmol/L    Anion Gap 9 (L) 10 - 20 mmol/L    Urea Nitrogen 26 (H) 6 - 23 mg/dL    Creatinine 1.36 (H) 0.50 - 1.30 mg/dL    eGFR 59 (L) >60  mL/min/1.73m*2    Calcium 8.8 8.6 - 10.3 mg/dL         Assessment:     Status Post right Total Knee Arthroplasty. Doing well postoperatively. No acute events overnight.     Acute postoperative pain: Reports mild to moderate pain to operative extremity. Exacerbated by movement, relieved by rest ice and pain medication.  Continue current pain management.     Acute postoperative blood loss anemia: Secondary to expected surgical blood loss. Hemoglobin this A.M. 11.1.  Patient asymptomatic, and remains hemodynamically stable with no signs of active bleeding.       Plan:      1.  Discharge today, Return to Clinic: 2 weeks   2.  Continue Deep venous thrombosis prophylaxis: Aspirin 81mg 2 times daily for 30 days  3.  Continue physical therapy:  Weight-bearing as tolerated with use of walker for assistance with ambulation   4.  Continue Pain Control: Scripts sent  5.  Discharge planning: patient plans to return to home with  home care at discharge, orders placed. SW and TCC following for discharge planning.       Dafne Lau PA-C   5/13/2025 9:09 AM

## 2025-05-14 ENCOUNTER — HOME CARE VISIT (OUTPATIENT)
Dept: HOME HEALTH SERVICES | Facility: HOME HEALTH | Age: 65
End: 2025-05-14
Payer: COMMERCIAL

## 2025-05-14 LAB
BACTERIA SPEC CULT: NORMAL
GRAM STN SPEC: NORMAL
GRAM STN SPEC: NORMAL

## 2025-05-14 PROCEDURE — G0151 HHCP-SERV OF PT,EA 15 MIN: HCPCS

## 2025-05-14 PROCEDURE — G0152 HHCP-SERV OF OT,EA 15 MIN: HCPCS

## 2025-05-14 SDOH — HEALTH STABILITY: PHYSICAL HEALTH: EXERCISE TYPE: TKR PROTOCOL

## 2025-05-14 ASSESSMENT — ENCOUNTER SYMPTOMS
LOWEST PAIN SEVERITY IN PAST 24 HOURS: 4/10
PAIN LOCATION - RELIEVING FACTORS: MEDS REST
SUBJECTIVE PAIN PROGRESSION: GRADUALLY IMPROVING
PAIN: 1
HIGHEST PAIN SEVERITY IN PAST 24 HOURS: 5/10
PERSON REPORTING PAIN: PATIENT
PAIN LOCATION - PAIN SEVERITY: 4/10
PAIN SEVERITY GOAL: 0/10
PAIN LOCATION: RIGHT KNEE
HYPERTENSION: 1
PAIN LOCATION - PAIN SEVERITY: 4/10
PAIN LOCATION - RELIEVING FACTORS: PAIN MEDS, ICING
PAIN: 1
PAIN LOCATION - PAIN QUALITY: ACHE
PERSON REPORTING PAIN: PATIENT
PAIN LOCATION - EXACERBATING FACTORS: AMBULATION, TRANSFERS
PAIN LOCATION: RIGHT KNEE

## 2025-05-14 ASSESSMENT — ACTIVITIES OF DAILY LIVING (ADL)
CURRENT_FUNCTION: STAND BY ASSIST
AMBULATION ASSISTANCE: STAND BY ASSIST
BATHING_CURRENT_FUNCTION: INDEPENDENT
BATHING ASSESSED: 1
OASIS_M1830: 02
AMBULATION ASSISTANCE ON FLAT SURFACES: 1
DRESSING_LB_CURRENT_FUNCTION: INDEPENDENT
ENTERING_EXITING_HOME: MAXIMUM ASSIST

## 2025-05-15 ENCOUNTER — HOME CARE VISIT (OUTPATIENT)
Dept: HOME HEALTH SERVICES | Facility: HOME HEALTH | Age: 65
End: 2025-05-15
Payer: COMMERCIAL

## 2025-05-15 PROCEDURE — G0151 HHCP-SERV OF PT,EA 15 MIN: HCPCS

## 2025-05-15 SDOH — HEALTH STABILITY: PHYSICAL HEALTH: EXERCISE ACTIVITY: GAIT TRAINING

## 2025-05-15 SDOH — HEALTH STABILITY: PHYSICAL HEALTH: PHYSICAL EXERCISE: 10

## 2025-05-15 SDOH — HEALTH STABILITY: PHYSICAL HEALTH: EXERCISE ACTIVITY: AROM RT KNEE

## 2025-05-15 SDOH — HEALTH STABILITY: PHYSICAL HEALTH: PHYSICAL EXERCISE: SUPINE, SITTING, STANDING

## 2025-05-15 SDOH — HEALTH STABILITY: PHYSICAL HEALTH: EXERCISE ACTIVITIES SETS: 2

## 2025-05-15 SDOH — HEALTH STABILITY: PHYSICAL HEALTH: RESISTANCE: AS TOLERATED

## 2025-05-15 SDOH — HEALTH STABILITY: PHYSICAL HEALTH: EXERCISE TYPE: TKA PROTOCOL EXERCISES

## 2025-05-15 SDOH — HEALTH STABILITY: PHYSICAL HEALTH

## 2025-05-15 SDOH — HEALTH STABILITY: PHYSICAL HEALTH: EXERCISE ACTIVITY: TKA PROTOCOL EXERCISES

## 2025-05-15 ASSESSMENT — ENCOUNTER SYMPTOMS
PAIN: 1
PAIN LOCATION - RELIEVING FACTORS: PAIN MEDS, ICING
PAIN LOCATION: RIGHT KNEE
PERSON REPORTING PAIN: PATIENT
PAIN LOCATION - EXACERBATING FACTORS: EXERCISES
PAIN LOCATION - PAIN SEVERITY: 6/10

## 2025-05-15 ASSESSMENT — ACTIVITIES OF DAILY LIVING (ADL): AMBULATION ASSISTANCE ON FLAT SURFACES: 1

## 2025-05-15 NOTE — HOME HEALTH
GOALS: PATIENT WILL DEMONSTRATE IMPROVED ENDURANCE, ROM, STRENGTH, AND MOBILITY.  SUBJECTIVE: THE PATIENT REPORTED 4/10 PAIN TO HIS RIGHT KNEE. PATIENT REPORTED DIFFICULTY WITH AMBULATION.  PRIOR LEVEL OF FUNCTION: MAX ASSIST NEEDED FOR TRANSFERS AND AMBULATION.  LIVING CONDITION: PATIENT IS CURRENTLY LIVING IN A ONE-STORY BUILDING WITH STAIRS. PATIENT IS CURRENTLY LIVING WITH HIS FAMILY.  OBJECTIVE: MANUAL MUSCLE TESTING WAS PERFORMED TO DETERMINE RLE STRENGTH. PATIENT DEMONSTRATED 2-/5 MUSCLE STRENGTH TO RLE. BALANCE TESTING WAS PERFORMED WHICH SHOWED PATIENT TO BE A HIGH FALL RISK. PATIENT WAS ABLE TO WALK 60 FEET WITH WW, BUT DEMONSTRATED DIFFICULTY WITH TRANSFERS FROM STS AND GAIT ABNORMALITIES ALONG WITH BALANCE IMPAIRMENT.  THERAPEUTIC ACTIVITIES: MMT, ROM, MOBILITY, AND GAIT ASSESSMENT COMPLETED  ASSESSMENT: DEMONSTRATED GROSSLY REDUCED STRENGTH AND ENDURANCE SECONDARY TO ASSOCIATED COMORBID CONDITIONS. PATIENT, PRESENTLY, IS A FALL RISK, CURRENTLY AMBULATING WITH A WALKER. FALL PRECAUTIONS DISCUSSED.  PLAN: CONSIDER COMORBID FACTORS WHEN IMPLEMENTING POC. CONTINUE WITH GENERAL ENDURANCE AND STRENGTH TRAINING UE'S AND LE'S, GAIT TRAINING ACTIVITIES, BALANCE AND PROPRIOCEPTIVE TRAINING, AND FALL PREVENTION STRATEGIES AS PER PT POC. PROGRESS AS TOLERATED.  PLAN FOR NEXT VISIT: SKILLED EXERCISES, ROM TRAINING, STRENGTH TRAINING.  REHAB POTENTIALS FOR STATED GOALS: GOOD  PATIENT STATED GOAL: ABLE TO WALK WITHOUT DIFFICULTY.

## 2025-05-16 LAB
LABORATORY COMMENT REPORT: NORMAL
PATH REPORT.FINAL DX SPEC: NORMAL
PATH REPORT.GROSS SPEC: NORMAL
PATH REPORT.RELEVANT HX SPEC: NORMAL
PATH REPORT.TOTAL CANCER: NORMAL

## 2025-05-19 ENCOUNTER — TELEPHONE (OUTPATIENT)
Dept: PHYSICAL THERAPY | Facility: CLINIC | Age: 65
End: 2025-05-19
Payer: COMMERCIAL

## 2025-05-19 ENCOUNTER — HOME CARE VISIT (OUTPATIENT)
Dept: HOME HEALTH SERVICES | Facility: HOME HEALTH | Age: 65
End: 2025-05-19
Payer: COMMERCIAL

## 2025-05-19 PROCEDURE — G0151 HHCP-SERV OF PT,EA 15 MIN: HCPCS

## 2025-05-19 SDOH — HEALTH STABILITY: PHYSICAL HEALTH

## 2025-05-19 SDOH — HEALTH STABILITY: PHYSICAL HEALTH: EXERCISE TYPE: TKA PROTOCOL EXERCISES

## 2025-05-19 SDOH — HEALTH STABILITY: PHYSICAL HEALTH: EXERCISE ACTIVITIES SETS: 2

## 2025-05-19 SDOH — HEALTH STABILITY: PHYSICAL HEALTH: PHYSICAL EXERCISE: SUPINE, SITTING, STANDING

## 2025-05-19 SDOH — HEALTH STABILITY: PHYSICAL HEALTH: PHYSICAL EXERCISE: 10

## 2025-05-19 SDOH — HEALTH STABILITY: PHYSICAL HEALTH: EXERCISE ACTIVITY: AAROM, AROM RT KNEE

## 2025-05-19 SDOH — HEALTH STABILITY: PHYSICAL HEALTH: RESISTANCE: AS TOLERATED

## 2025-05-19 SDOH — HEALTH STABILITY: PHYSICAL HEALTH: EXERCISE ACTIVITY: TKA PROTOCOL EXERCISES

## 2025-05-19 SDOH — HEALTH STABILITY: PHYSICAL HEALTH: EXERCISE ACTIVITY: GAIT TRAINING

## 2025-05-19 NOTE — PROGRESS NOTES
Called patient to discuss outpatient PT upon completion of [facility] services. Patient is going to decide at the end of the week if he is going to attend outpatient PT. Will call back if he wishes to schedule.

## 2025-05-22 ENCOUNTER — HOME CARE VISIT (OUTPATIENT)
Dept: HOME HEALTH SERVICES | Facility: HOME HEALTH | Age: 65
End: 2025-05-22
Payer: COMMERCIAL

## 2025-05-22 PROCEDURE — G0151 HHCP-SERV OF PT,EA 15 MIN: HCPCS

## 2025-05-22 SDOH — HEALTH STABILITY: PHYSICAL HEALTH: PHYSICAL EXERCISE: SITTING, SUPINE, STANDING

## 2025-05-22 SDOH — HEALTH STABILITY: PHYSICAL HEALTH: PHYSICAL EXERCISE: SUPINE, STANDING, SITTING

## 2025-05-22 SDOH — HEALTH STABILITY: PHYSICAL HEALTH: PHYSICAL EXERCISE: 10

## 2025-05-22 SDOH — HEALTH STABILITY: PHYSICAL HEALTH: EXERCISE ACTIVITY: TKA PROTOCOL EXERCISESQ

## 2025-05-22 SDOH — HEALTH STABILITY: PHYSICAL HEALTH: EXERCISE ACTIVITIES SETS: 2

## 2025-05-22 SDOH — HEALTH STABILITY: PHYSICAL HEALTH: EXERCISE ACTIVITIES SETS: 1

## 2025-05-22 SDOH — HEALTH STABILITY: PHYSICAL HEALTH: EXERCISE ACTIVITY: STS

## 2025-05-22 SDOH — HEALTH STABILITY: PHYSICAL HEALTH: RESISTANCE: AS TOLERATED

## 2025-05-22 SDOH — HEALTH STABILITY: PHYSICAL HEALTH: EXERCISE ACTIVITY: AAROM, AROM RT KNEE

## 2025-05-22 SDOH — HEALTH STABILITY: PHYSICAL HEALTH: EXERCISE TYPE: TKA PROTOCOL EXERCISES

## 2025-05-22 ASSESSMENT — ENCOUNTER SYMPTOMS
PAIN LOCATION - EXACERBATING FACTORS: EXERCISES
PAIN LOCATION: RIGHT KNEE
PERSON REPORTING PAIN: PATIENT
PAIN: 1
PAIN LOCATION - PAIN SEVERITY: 6/10

## 2025-05-22 ASSESSMENT — ACTIVITIES OF DAILY LIVING (ADL)
AMBULATION ASSISTANCE ON FLAT SURFACES: 1
AMBULATION_DISTANCE/DURATION_TOLERATED: 50 FT

## 2025-05-28 ENCOUNTER — HOME CARE VISIT (OUTPATIENT)
Dept: HOME HEALTH SERVICES | Facility: HOME HEALTH | Age: 65
End: 2025-05-28
Payer: COMMERCIAL

## 2025-05-28 PROCEDURE — G0151 HHCP-SERV OF PT,EA 15 MIN: HCPCS

## 2025-05-28 SDOH — HEALTH STABILITY: PHYSICAL HEALTH: EXERCISE ACTIVITIES SETS: 1

## 2025-05-28 SDOH — HEALTH STABILITY: PHYSICAL HEALTH: RESISTANCE: AS TOLERATED

## 2025-05-28 SDOH — HEALTH STABILITY: PHYSICAL HEALTH: EXERCISE ACTIVITY: STS TRANSFERS

## 2025-05-28 SDOH — HEALTH STABILITY: PHYSICAL HEALTH: PHYSICAL EXERCISE: 10

## 2025-05-28 SDOH — HEALTH STABILITY: PHYSICAL HEALTH: EXERCISE ACTIVITIES SETS: 2

## 2025-05-28 SDOH — HEALTH STABILITY: PHYSICAL HEALTH: EXERCISE ACTIVITY: SKILLED THERAPEUTIC EXERCISES

## 2025-05-28 SDOH — HEALTH STABILITY: PHYSICAL HEALTH: PHYSICAL EXERCISE: 15

## 2025-05-28 SDOH — HEALTH STABILITY: PHYSICAL HEALTH: EXERCISE TYPE: SKILLED THERAPEUTIC EXERCISES

## 2025-05-28 SDOH — HEALTH STABILITY: PHYSICAL HEALTH: PHYSICAL EXERCISE: SUPINE, SITTING, STANDING

## 2025-05-28 SDOH — HEALTH STABILITY: PHYSICAL HEALTH: EXERCISE ACTIVITY: AAROM, AROM RT KNEE

## 2025-05-28 ASSESSMENT — ENCOUNTER SYMPTOMS
PAIN LOCATION: RIGHT KNEE
PAIN: 1
PAIN LOCATION - PAIN SEVERITY: 4/10
PERSON REPORTING PAIN: PATIENT

## 2025-05-29 ENCOUNTER — HOME CARE VISIT (OUTPATIENT)
Dept: HOME HEALTH SERVICES | Facility: HOME HEALTH | Age: 65
End: 2025-05-29
Payer: COMMERCIAL

## 2025-05-29 PROCEDURE — G0151 HHCP-SERV OF PT,EA 15 MIN: HCPCS

## 2025-05-29 SDOH — HEALTH STABILITY: PHYSICAL HEALTH: EXERCISE ACTIVITY: SKILLED THERAPEUTIC EXERCISES

## 2025-05-29 SDOH — HEALTH STABILITY: PHYSICAL HEALTH: EXERCISE ACTIVITIES SETS: 2

## 2025-05-29 SDOH — HEALTH STABILITY: PHYSICAL HEALTH: EXERCISE ACTIVITY: AAROM, AROM RT KNEE

## 2025-05-29 SDOH — HEALTH STABILITY: PHYSICAL HEALTH: RESISTANCE: AS TOLERATED

## 2025-05-29 SDOH — HEALTH STABILITY: PHYSICAL HEALTH: PHYSICAL EXERCISE: SUPINE, SITTING, STANDING

## 2025-05-29 SDOH — HEALTH STABILITY: PHYSICAL HEALTH: PHYSICAL EXERCISE: 15

## 2025-05-29 SDOH — HEALTH STABILITY: PHYSICAL HEALTH: EXERCISE TYPE: SKILLED THERAPEUTIC EXERCISES

## 2025-05-29 ASSESSMENT — ENCOUNTER SYMPTOMS
PERSON REPORTING PAIN: PATIENT
DENIES PAIN: 1

## 2025-06-01 ENCOUNTER — HOME CARE VISIT (OUTPATIENT)
Dept: HOME HEALTH SERVICES | Facility: HOME HEALTH | Age: 65
End: 2025-06-01
Payer: COMMERCIAL

## 2025-06-01 PROCEDURE — G0151 HHCP-SERV OF PT,EA 15 MIN: HCPCS

## 2025-06-01 NOTE — Clinical Note
Patient seen for home health PT discharge visit today. Patient demonstrated compliance with HEP, pain management programm, and swelling management program. Patient would highly benefit from continuing PT in an outpatient rehab setting.

## 2025-06-02 DIAGNOSIS — Z96.651 S/P REVISION OF TOTAL KNEE, RIGHT: Primary | ICD-10-CM

## 2025-06-02 SDOH — HEALTH STABILITY: PHYSICAL HEALTH: EXERCISE TYPE: SKILLED HEP

## 2025-06-02 ASSESSMENT — ENCOUNTER SYMPTOMS
PERSON REPORTING PAIN: PATIENT
DENIES PAIN: 1

## 2025-06-02 ASSESSMENT — ACTIVITIES OF DAILY LIVING (ADL)
AMBULATION ASSISTANCE: STAND BY ASSIST
OASIS_M1830: 00
CURRENT_FUNCTION: STAND BY ASSIST
HOME_HEALTH_OASIS: 00
AMBULATION ASSISTANCE ON FLAT SURFACES: 1

## 2025-06-02 NOTE — HOME HEALTH
S: Patient seen for home health PT discharge visit today. Patient reported no pain and has been compliant with HEP.    O: Patient ambulated in the home using no AD.    P: Patient discharged from home health PT today as patient has achieved PT goals and has been independent in performing the HEP in correct pattern. Patient in agreement with the discharge plan. Patient would highly benefit from continuing PT in an outpatient rehab setting

## 2025-06-02 NOTE — PROGRESS NOTES
No chief complaint on file.      The patient is here for follow-up of their side: right knee poly swap.    The patient has no knee pain.  The patient has no mechanical symptoms.  The patient has mild swelling.  The patient is approximately 3 week(s) postop    Physical examination:    Examination of the side: right knee  The incision is healing well  No erythema or warmth.  No instability varus or valgus stressing the knee at 0, 30 or 60 degrees.  No instability in the AP plane at 90 degrees.  Range of motion: 0 degrees extension, 120 degrees flexion  There is no tenderness  Calf is soft, Homans negative  The patient has intact ankle dorsiflexion and plantarflexion.    Radiographs:   See dictated report    Laboratory studies:  Intraoperative cultures negative    Impression:  Status post revision  side: right knee poly swap    Plan:  Finish aspirin 81 mg twice daily  Outpatient physical therapy  Follow up in 9 weeks with XR   All questions answered

## 2025-06-03 ENCOUNTER — HOSPITAL ENCOUNTER (OUTPATIENT)
Dept: RADIOLOGY | Facility: HOSPITAL | Age: 65
Discharge: HOME | End: 2025-06-03
Payer: COMMERCIAL

## 2025-06-03 ENCOUNTER — APPOINTMENT (OUTPATIENT)
Dept: ORTHOPEDIC SURGERY | Facility: CLINIC | Age: 65
End: 2025-06-03
Payer: COMMERCIAL

## 2025-06-03 DIAGNOSIS — Z96.651 S/P REVISION OF TOTAL KNEE, RIGHT: Primary | ICD-10-CM

## 2025-06-03 DIAGNOSIS — Z96.651 S/P REVISION OF TOTAL KNEE, RIGHT: ICD-10-CM

## 2025-06-03 PROCEDURE — 99024 POSTOP FOLLOW-UP VISIT: CPT | Performed by: ORTHOPAEDIC SURGERY

## 2025-06-03 PROCEDURE — 73562 X-RAY EXAM OF KNEE 3: CPT | Mod: RT

## 2025-06-03 PROCEDURE — 73562 X-RAY EXAM OF KNEE 3: CPT | Mod: RIGHT SIDE | Performed by: RADIOLOGY

## 2025-07-29 DIAGNOSIS — Z96.651 S/P REVISION OF TOTAL KNEE, RIGHT: Primary | ICD-10-CM

## 2025-08-05 ENCOUNTER — HOSPITAL ENCOUNTER (OUTPATIENT)
Dept: RADIOLOGY | Facility: HOSPITAL | Age: 65
Discharge: HOME | End: 2025-08-05
Payer: COMMERCIAL

## 2025-08-05 ENCOUNTER — APPOINTMENT (OUTPATIENT)
Dept: ORTHOPEDIC SURGERY | Facility: CLINIC | Age: 65
End: 2025-08-05
Payer: COMMERCIAL

## 2025-08-05 DIAGNOSIS — Z96.651 S/P REVISION OF TOTAL KNEE, RIGHT: Primary | ICD-10-CM

## 2025-08-05 DIAGNOSIS — M25.461 EFFUSION OF RIGHT KNEE: ICD-10-CM

## 2025-08-05 DIAGNOSIS — Z96.651 S/P REVISION OF TOTAL KNEE, RIGHT: ICD-10-CM

## 2025-08-05 PROCEDURE — 73562 X-RAY EXAM OF KNEE 3: CPT | Mod: RT

## 2025-08-05 PROCEDURE — 1036F TOBACCO NON-USER: CPT | Performed by: ORTHOPAEDIC SURGERY

## 2025-08-05 PROCEDURE — 20610 DRAIN/INJ JOINT/BURSA W/O US: CPT | Performed by: ORTHOPAEDIC SURGERY

## 2025-08-05 PROCEDURE — 73562 X-RAY EXAM OF KNEE 3: CPT | Mod: RIGHT SIDE | Performed by: RADIOLOGY

## 2025-08-05 NOTE — PROGRESS NOTES
No chief complaint on file.      The patient is here for follow-up of their revision side: right knee arthroplasty/poly swap.  The patient has no knee pain.  The patient has no mechanical symptoms.  The patient has no swelling.  The patient is approximately 3 month(s) postop    Physical examination:    Examination of the side: right knee  The incision is healing well  No erythema or warmth.  No instability varus or valgus stressing the knee at 0, 30 or 60 degrees.  No instability in the AP plane at 90 degrees.  Range of motion: 0 degrees extension, 120 degrees flexion  There is no tenderness  Calf is soft, Homans negative  The patient has intact ankle dorsiflexion and plantarflexion.    Radiographs:   See dictated report        Impression:  Status post revision side: right total knee arthroplasty/poly swap  Right knee effusion    Plan:  Discussed the importance of prophylactic dental antibiotics  I will aspirate his knee for comfort and to help improve his motion.  Follow up in 3 months with XR   All questions answered    L Inj/Asp: R knee on 8/5/2025 10:56 AM  Indications: joint swelling  Details: 21 G needle, superolateral approach  Aspirate: 17 mL blood-tinged  Outcome: tolerated well, no immediate complications  Procedure, treatment alternatives, risks and benefits explained, specific risks discussed. Consent was given by the patient. Patient was prepped and draped in the usual sterile fashion.

## 2025-09-01 DIAGNOSIS — I10 PRIMARY HYPERTENSION: ICD-10-CM

## 2025-09-01 RX ORDER — LISINOPRIL AND HYDROCHLOROTHIAZIDE 12.5; 2 MG/1; MG/1
1 TABLET ORAL DAILY
Qty: 90 TABLET | Refills: 3 | Status: SHIPPED | OUTPATIENT
Start: 2025-09-01

## 2025-11-04 ENCOUNTER — APPOINTMENT (OUTPATIENT)
Dept: ORTHOPEDIC SURGERY | Facility: CLINIC | Age: 65
End: 2025-11-04
Payer: COMMERCIAL

## (undated) DEVICE — SCREW BNE L25MM DIA2.5MM KNEE FULL THRD HEX FEM PERSONA (NOT IMPLANTED)

## (undated) DEVICE — SUTURE, VICRYL, 1, 36 IN, V-34, VIOLET

## (undated) DEVICE — DRESSING HYDROFIBER AQUACEL AG ADVANTAGE 3.5X12 IN

## (undated) DEVICE — GLOVE ORANGE PI 7 1/2   MSG9075

## (undated) DEVICE — GLOVE, SURGICAL, PROTEXIS PI , 8.5, PF, LF

## (undated) DEVICE — BLADE, STRYKER, 12 X 76 X 1.00L, TOOTH

## (undated) DEVICE — SUTURE VCRL SZ 1 L36IN ABSRB UD L36MM CT-1 1/2 CIR J947H

## (undated) DEVICE — DRAPE, SHEET, EXTREMITY, W/ARM BOARD COVERS, 87 X 106 X 128 IN, DISPOSABLE, LF, STERILE

## (undated) DEVICE — CAUTERY, PENCIL, PUSH BUTTON, SMOKE EVAC, 70MM

## (undated) DEVICE — DRAPE,U/ SHT,SPLIT,PLAS,STERIL: Brand: MEDLINE

## (undated) DEVICE — BATH BLANKET STERILE

## (undated) DEVICE — PADDING, CAST, SPECIALIST, 6 IN X 4 YD, STERILE

## (undated) DEVICE — T-DRAPE,EXTREMITY,STERILE: Brand: MEDLINE

## (undated) DEVICE — DRESSING, MEPILEX BORDER, POST-OP AG, 4 X 12 IN

## (undated) DEVICE — STERILE PATIENT PROTECTIVE PAD FOR IMP® KNEE POSITIONERS & COHESIVE WRAP (10 / CASE): Brand: DE MAYO KNEE POSITIONER®

## (undated) DEVICE — MANIFOLD, 4 PORT NEPTUNE STANDARD

## (undated) DEVICE — GLOVE ORANGE PI 8   MSG9080

## (undated) DEVICE — GLOVE, SURGICAL, PROTEXIS PI , 7.5, PF, LF

## (undated) DEVICE — Device: Brand: STABLECUT®

## (undated) DEVICE — WRAP, DEMAYO, LEG, STERILE

## (undated) DEVICE — STRAP, VELCRO, BODY, 4 X 60IN, NS

## (undated) DEVICE — 450 ML BOTTLE OF 0.05% CHLORHEXIDINE GLUCONATE IN 99.95% STERILE WATER FOR IRRIGATION, USP AND APPLICATOR.: Brand: IRRISEPT ANTIMICROBIAL WOUND LAVAGE

## (undated) DEVICE — TOWEL PACK, STERILE, 16X24, XRAY DETECTABLE, BLUE, 4/PK

## (undated) DEVICE — SUTURE MCRYL SZ 3-0 L27IN ABSRB UD L19MM PS-2 3/8 CIR PRIM Y427H

## (undated) DEVICE — CUFF, TOURNIQUET, 30 X 4, DUAL PORT/SNGL BLADDER, DISP, LF

## (undated) DEVICE — GLOVE ORANGE PI 8 1/2   MSG9085

## (undated) DEVICE — SYRINGE MED 50ML LUERLOCK TIP

## (undated) DEVICE — GOWN, SURGICAL, ROYAL SILK, XXL, STERILE

## (undated) DEVICE — PIN HOLDING HDLSS 3.2X75 MM TROCAR ZUK

## (undated) DEVICE — TUBING, SUCTION, 9/32" X 12', STRAIGHT: Brand: MEDLINE INDUSTRIES, INC.

## (undated) DEVICE — TOTAL KNEE: Brand: MEDLINE INDUSTRIES, INC.

## (undated) DEVICE — Device

## (undated) DEVICE — BANDAGE, ELASTIC, 6 X 10YD, BEIGE, LF

## (undated) DEVICE — SOLUTION, SODIUM CHLORIDE 0.9%, 3000ML, BAG

## (undated) DEVICE — WOUND SYSTEM, DEBRIDEMENT & CLEANING, O.R DUOPAK

## (undated) DEVICE — FAN SPRAY KIT: Brand: PULSAVAC®

## (undated) DEVICE — SHEET,DRAPE,53X77,STERILE: Brand: MEDLINE

## (undated) DEVICE — APPLICATOR MEDICATED 26 CC SOLUTION HI LT ORNG CHLORAPREP

## (undated) DEVICE — TUBING, IRRIGATION, HIGH FLOW, HAND PIECE SET

## (undated) DEVICE — STRAP, ARM BOARD, 32 X 1.5

## (undated) DEVICE — HOOD, SURGICAL, FLYTE, T7

## (undated) DEVICE — BLADE, STRYKER, OSC, 19 X 90 X 1.27G

## (undated) DEVICE — GLOVE, SURGICAL, PROTEXIS PI BLUE W/NEUTHERA, 7.5, PF, LF

## (undated) DEVICE — SUTURE ETHBND EXCEL SZ 1 L30IN NONABSORBABLE GRN L48MM CTX X865H

## (undated) DEVICE — ADHESIVE, SKIN, DERMABOND ADVANCED, 15CM, PEN-STYLE

## (undated) DEVICE — LABEL MED MINI W/ MARKER

## (undated) DEVICE — COLLECTION/DELIVERY SYSTEM, COPAN ESWAB, REG SIZE SWAB

## (undated) DEVICE — SUTURE, MONOCRYL, 3-0, 36 IN, CT-1, UNDYED

## (undated) DEVICE — 4-PORT MANIFOLD: Brand: NEPTUNE 2

## (undated) DEVICE — SOLUTION, IRRIGATION, STERILE WATER, 1000 ML, HANG BOTTLE

## (undated) DEVICE — GOWN,SIRUS,NONRNF,SETINSLV,2XL,18/CS: Brand: MEDLINE

## (undated) DEVICE — PREP, IODOPHOR, W/ALCOHOL, DURAPREP, W/APPLICATOR, 26 CC

## (undated) DEVICE — SUTURE VCRL SZ 2-0 L36IN ABSRB UD L36MM CT-1 1/2 CIR J945H

## (undated) DEVICE — NEPTUNE E-SEP SMOKE EVACUATION PENCIL, COATED, 70MM BLADE, PUSH BUTTON SWITCH: Brand: NEPTUNE E-SEP

## (undated) DEVICE — PADDING CAST W6INXL4YD POLY POR SPUN DACRON NON STERILE